# Patient Record
Sex: FEMALE | Race: WHITE | NOT HISPANIC OR LATINO | Employment: OTHER | URBAN - METROPOLITAN AREA
[De-identification: names, ages, dates, MRNs, and addresses within clinical notes are randomized per-mention and may not be internally consistent; named-entity substitution may affect disease eponyms.]

---

## 2023-04-21 DIAGNOSIS — I10 HYPERTENSION, UNSPECIFIED TYPE: Primary | ICD-10-CM

## 2023-04-21 RX ORDER — METOPROLOL SUCCINATE 50 MG/1
TABLET, EXTENDED RELEASE ORAL
COMMUNITY
Start: 2018-10-24 | End: 2023-04-21 | Stop reason: SDUPTHER

## 2023-04-22 RX ORDER — METOPROLOL SUCCINATE 50 MG/1
50 TABLET, EXTENDED RELEASE ORAL DAILY
Qty: 90 TABLET | Refills: 1 | Status: SHIPPED | OUTPATIENT
Start: 2023-04-22 | End: 2023-10-31

## 2023-05-08 LAB — CANCER AG 19-9 (U/ML) IN SER/PLAS: 31.01 U/ML

## 2023-07-14 LAB — CANCER AG 19-9 (U/ML) IN SER/PLAS: 19.36 U/ML

## 2023-07-18 DIAGNOSIS — J45.40 MODERATE PERSISTENT ASTHMA WITHOUT COMPLICATION (HHS-HCC): ICD-10-CM

## 2023-07-18 DIAGNOSIS — I10 HYPERTENSION, UNSPECIFIED TYPE: Primary | ICD-10-CM

## 2023-07-19 RX ORDER — LOSARTAN POTASSIUM 25 MG/1
TABLET ORAL
Qty: 90 TABLET | Refills: 0 | Status: SHIPPED | OUTPATIENT
Start: 2023-07-19 | End: 2023-08-07 | Stop reason: ALTCHOICE

## 2023-07-19 RX ORDER — MONTELUKAST SODIUM 10 MG/1
TABLET ORAL
Qty: 90 TABLET | Refills: 3 | Status: SHIPPED | OUTPATIENT
Start: 2023-07-19

## 2023-08-07 ENCOUNTER — OFFICE VISIT (OUTPATIENT)
Dept: PRIMARY CARE | Facility: CLINIC | Age: 78
End: 2023-08-07
Payer: COMMERCIAL

## 2023-08-07 VITALS
SYSTOLIC BLOOD PRESSURE: 103 MMHG | RESPIRATION RATE: 19 BRPM | OXYGEN SATURATION: 97 % | DIASTOLIC BLOOD PRESSURE: 71 MMHG | TEMPERATURE: 96.6 F | HEART RATE: 81 BPM | WEIGHT: 230 LBS | BODY MASS INDEX: 34.07 KG/M2 | HEIGHT: 69 IN

## 2023-08-07 DIAGNOSIS — J44.9 CHRONIC OBSTRUCTIVE PULMONARY DISEASE, UNSPECIFIED COPD TYPE (MULTI): ICD-10-CM

## 2023-08-07 DIAGNOSIS — I10 HYPERTENSION, UNSPECIFIED TYPE: ICD-10-CM

## 2023-08-07 DIAGNOSIS — E66.09 CLASS 1 OBESITY DUE TO EXCESS CALORIES WITH SERIOUS COMORBIDITY AND BODY MASS INDEX (BMI) OF 33.0 TO 33.9 IN ADULT: ICD-10-CM

## 2023-08-07 DIAGNOSIS — D37.8 INTRADUCTAL PAPILLARY MUCINOUS TUMOR OF UNCERTAIN BEHAVIOR OF PANCREAS: ICD-10-CM

## 2023-08-07 DIAGNOSIS — Z78.0 MENOPAUSE: ICD-10-CM

## 2023-08-07 DIAGNOSIS — Z86.79 HISTORY OF ATRIAL FIBRILLATION: ICD-10-CM

## 2023-08-07 DIAGNOSIS — C34.90 MALIGNANT NEOPLASM OF LUNG, UNSPECIFIED LATERALITY, UNSPECIFIED PART OF LUNG (MULTI): ICD-10-CM

## 2023-08-07 DIAGNOSIS — Z00.00 MEDICARE ANNUAL WELLNESS VISIT, SUBSEQUENT: Primary | ICD-10-CM

## 2023-08-07 DIAGNOSIS — J33.9 NASAL POLYPOSIS: ICD-10-CM

## 2023-08-07 PROBLEM — R73.03 PREDIABETES: Status: ACTIVE | Noted: 2023-08-07

## 2023-08-07 PROBLEM — S13.170A: Status: ACTIVE | Noted: 2023-08-07

## 2023-08-07 PROBLEM — E55.9 VITAMIN D DEFICIENCY: Status: ACTIVE | Noted: 2023-08-07

## 2023-08-07 PROBLEM — J32.2 CHRONIC ETHMOIDAL SINUSITIS: Status: ACTIVE | Noted: 2023-08-07

## 2023-08-07 PROBLEM — R73.03 PREDIABETES: Status: RESOLVED | Noted: 2023-08-07 | Resolved: 2023-08-07

## 2023-08-07 PROBLEM — J45.909 ASTHMA (HHS-HCC): Status: ACTIVE | Noted: 2023-08-07

## 2023-08-07 PROBLEM — R43.8 DECREASED SENSE OF SMELL: Status: ACTIVE | Noted: 2023-08-07

## 2023-08-07 PROCEDURE — 1159F MED LIST DOCD IN RCRD: CPT | Performed by: PEDIATRICS

## 2023-08-07 PROCEDURE — 3074F SYST BP LT 130 MM HG: CPT | Performed by: PEDIATRICS

## 2023-08-07 PROCEDURE — G0439 PPPS, SUBSEQ VISIT: HCPCS | Performed by: PEDIATRICS

## 2023-08-07 PROCEDURE — 3078F DIAST BP <80 MM HG: CPT | Performed by: PEDIATRICS

## 2023-08-07 PROCEDURE — 1170F FXNL STATUS ASSESSED: CPT | Performed by: PEDIATRICS

## 2023-08-07 PROCEDURE — 1160F RVW MEDS BY RX/DR IN RCRD: CPT | Performed by: PEDIATRICS

## 2023-08-07 PROCEDURE — 1036F TOBACCO NON-USER: CPT | Performed by: PEDIATRICS

## 2023-08-07 PROCEDURE — 1126F AMNT PAIN NOTED NONE PRSNT: CPT | Performed by: PEDIATRICS

## 2023-08-07 PROCEDURE — 99213 OFFICE O/P EST LOW 20 MIN: CPT | Performed by: PEDIATRICS

## 2023-08-07 RX ORDER — FUROSEMIDE 20 MG/1
40 TABLET ORAL
COMMUNITY
Start: 2021-11-24 | End: 2023-08-07 | Stop reason: ALTCHOICE

## 2023-08-07 RX ORDER — PREDNISONE 20 MG/1
TABLET ORAL
COMMUNITY
End: 2023-08-07 | Stop reason: ALTCHOICE

## 2023-08-07 RX ORDER — LEVOFLOXACIN 500 MG/1
1 TABLET, FILM COATED ORAL DAILY
COMMUNITY
Start: 2022-10-19 | End: 2023-08-07 | Stop reason: ALTCHOICE

## 2023-08-07 RX ORDER — GUAIFENESIN 600 MG/1
TABLET, EXTENDED RELEASE ORAL EVERY 12 HOURS
COMMUNITY
Start: 2022-11-10 | End: 2023-08-07 | Stop reason: ALTCHOICE

## 2023-08-07 RX ORDER — CHOLECALCIFEROL (VITAMIN D3) 25 MCG
1 TABLET ORAL DAILY
COMMUNITY

## 2023-08-07 RX ORDER — PREDNISONE 10 MG/1
TABLET ORAL
COMMUNITY
End: 2023-08-07 | Stop reason: ALTCHOICE

## 2023-08-07 RX ORDER — TALC
3 POWDER (GRAM) TOPICAL DAILY PRN
COMMUNITY
Start: 2021-11-23 | End: 2023-08-07 | Stop reason: ALTCHOICE

## 2023-08-07 RX ORDER — METOPROLOL TARTRATE 25 MG/1
25 TABLET, FILM COATED ORAL
COMMUNITY
Start: 2021-11-02 | End: 2023-08-07 | Stop reason: ALTCHOICE

## 2023-08-07 RX ORDER — CALCIUM CARBONATE 200(500)MG
500 TABLET,CHEWABLE ORAL 3 TIMES DAILY PRN
COMMUNITY
Start: 2021-11-23 | End: 2023-08-07 | Stop reason: ALTCHOICE

## 2023-08-07 RX ORDER — FLUTICASONE PROPIONATE 93 UG/1
SPRAY, METERED NASAL
COMMUNITY
Start: 2020-01-22

## 2023-08-07 RX ORDER — IPRATROPIUM BROMIDE AND ALBUTEROL SULFATE 2.5; .5 MG/3ML; MG/3ML
SOLUTION RESPIRATORY (INHALATION) 4 TIMES DAILY
COMMUNITY
Start: 2022-11-05 | End: 2023-08-07 | Stop reason: ALTCHOICE

## 2023-08-07 RX ORDER — FLUTICASONE FUROATE, UMECLIDINIUM BROMIDE AND VILANTEROL TRIFENATATE 200; 62.5; 25 UG/1; UG/1; UG/1
POWDER RESPIRATORY (INHALATION)
COMMUNITY
Start: 2022-12-02 | End: 2024-05-02

## 2023-08-07 RX ORDER — MV/FA/DHA/EPA/FISH OIL/SAW/GNK 400MCG-200
1 COMBINATION PACKAGE (EA) ORAL DAILY
COMMUNITY
Start: 2022-02-24

## 2023-08-07 RX ORDER — LORATADINE 10 MG/1
10 TABLET ORAL
COMMUNITY
End: 2023-08-07 | Stop reason: ALTCHOICE

## 2023-08-07 RX ORDER — BENZONATATE 100 MG/1
CAPSULE ORAL 4 TIMES DAILY PRN
COMMUNITY
Start: 2022-11-10 | End: 2023-08-07 | Stop reason: ALTCHOICE

## 2023-08-07 RX ORDER — DOXYCYCLINE 100 MG/1
100 CAPSULE ORAL 2 TIMES DAILY
COMMUNITY
End: 2023-08-07 | Stop reason: ALTCHOICE

## 2023-08-07 RX ORDER — ALBUTEROL SULFATE 90 UG/1
AEROSOL, METERED RESPIRATORY (INHALATION)
COMMUNITY
Start: 2022-12-14

## 2023-08-07 RX ORDER — OSIMERTINIB 80 1/1
TABLET, FILM COATED ORAL
COMMUNITY
Start: 2023-01-09 | End: 2023-12-29 | Stop reason: WASHOUT

## 2023-08-07 RX ORDER — SENNOSIDES 8.6 MG/1
1 TABLET ORAL 2 TIMES DAILY
COMMUNITY
End: 2023-08-07 | Stop reason: ALTCHOICE

## 2023-08-07 RX ORDER — TRIAMCINOLONE ACETONIDE 1 MG/G
1 CREAM TOPICAL 2 TIMES DAILY
COMMUNITY
Start: 2021-11-23 | End: 2023-08-07 | Stop reason: ALTCHOICE

## 2023-08-07 RX ORDER — ACETAMINOPHEN 325 MG/1
TABLET ORAL EVERY 6 HOURS PRN
COMMUNITY
Start: 2022-03-05

## 2023-08-07 RX ORDER — POLYETHYLENE GLYCOL 3350 17 G/17G
17 POWDER, FOR SOLUTION ORAL DAILY PRN
COMMUNITY
Start: 2021-11-23 | End: 2023-08-07 | Stop reason: ALTCHOICE

## 2023-08-07 ASSESSMENT — ACTIVITIES OF DAILY LIVING (ADL)
DRESSING: INDEPENDENT
BATHING: INDEPENDENT
MANAGING_FINANCES: INDEPENDENT
DOING_HOUSEWORK: INDEPENDENT
TAKING_MEDICATION: INDEPENDENT
GROCERY_SHOPPING: INDEPENDENT

## 2023-08-07 ASSESSMENT — PAIN SCALES - GENERAL: PAINLEVEL: 0-NO PAIN

## 2023-08-07 ASSESSMENT — PATIENT HEALTH QUESTIONNAIRE - PHQ9
SUM OF ALL RESPONSES TO PHQ9 QUESTIONS 1 AND 2: 0
1. LITTLE INTEREST OR PLEASURE IN DOING THINGS: NOT AT ALL
2. FEELING DOWN, DEPRESSED OR HOPELESS: NOT AT ALL

## 2023-08-07 NOTE — PROGRESS NOTES
"Subjective   Patient ID: Tracey Chiu is a 78 y.o. female who presents for Medicare Annual Wellness Visit Subsequent.    HPI   Patient is doing well in terms of her lung cancer; Follows with ENT next week; Does not want pancreatic surgery; Does not want mammogram till next year.    Review of Systems    Objective   /71 (BP Location: Left arm, Patient Position: Sitting, BP Cuff Size: Large adult)   Pulse 81   Temp 35.9 °C (96.6 °F) (Temporal)   Resp 19   Ht 1.753 m (5' 9\")   Wt 104 kg (230 lb)   LMP  (LMP Unknown)   SpO2 97%   BMI 33.97 kg/m²     Physical Exam  Vitals reviewed.   Constitutional:       General: She is not in acute distress.  HENT:      Head: Normocephalic.      Right Ear: Tympanic membrane normal.      Left Ear: Tympanic membrane normal.      Nose:      Comments: Nasal polyp on right     Mouth/Throat:      Pharynx: Oropharynx is clear.   Cardiovascular:      Rate and Rhythm: Normal rate and regular rhythm.      Heart sounds: Normal heart sounds.   Pulmonary:      Breath sounds: Normal breath sounds.   Abdominal:      Palpations: Abdomen is soft.      Tenderness: There is no abdominal tenderness.   Musculoskeletal:         General: No tenderness. Normal range of motion.   Skin:     General: Skin is warm.      Findings: No rash.   Neurological:      General: No focal deficit present.      Mental Status: She is alert.   Psychiatric:         Mood and Affect: Mood normal.       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

## 2023-10-02 ENCOUNTER — SPECIALTY PHARMACY (OUTPATIENT)
Dept: PHARMACY | Facility: CLINIC | Age: 78
End: 2023-10-02

## 2023-10-06 ENCOUNTER — PHARMACY VISIT (OUTPATIENT)
Dept: PHARMACY | Facility: CLINIC | Age: 78
End: 2023-10-06
Payer: COMMERCIAL

## 2023-10-06 PROBLEM — A41.9 SEPSIS (MULTI): Status: ACTIVE | Noted: 2023-10-06

## 2023-10-06 PROBLEM — E66.9 OBESITY, CLASS I, BMI 30-34.9: Status: ACTIVE | Noted: 2021-10-29

## 2023-10-06 PROBLEM — I48.0 PAROXYSMAL ATRIAL FIBRILLATION WITH RVR (MULTI): Status: ACTIVE | Noted: 2023-10-06

## 2023-10-06 PROBLEM — J44.1 COPD EXACERBATION (MULTI): Status: ACTIVE | Noted: 2023-08-07

## 2023-10-06 PROBLEM — L82.1 SEBORRHEIC KERATOSIS: Status: ACTIVE | Noted: 2023-10-06

## 2023-10-06 PROBLEM — Q45.3 PANCREATIC ABNORMALITY: Status: ACTIVE | Noted: 2023-10-06

## 2023-10-06 PROBLEM — R93.89 ABNORMAL CT SCAN: Status: ACTIVE | Noted: 2023-10-06

## 2023-10-06 PROBLEM — I48.91 ATRIAL FIBRILLATION (MULTI): Status: ACTIVE | Noted: 2023-10-06

## 2023-10-06 PROBLEM — S12.9XXA CERVICAL SPINE FRACTURE (MULTI): Status: ACTIVE | Noted: 2021-10-27

## 2023-10-06 PROBLEM — K86.2 PANCREAS CYST (HHS-HCC): Status: ACTIVE | Noted: 2023-10-06

## 2023-10-06 PROBLEM — R91.8 LUNG MASS: Status: ACTIVE | Noted: 2023-10-06

## 2023-10-06 PROBLEM — E66.811 OBESITY, CLASS I, BMI 30-34.9: Status: ACTIVE | Noted: 2021-10-29

## 2023-10-06 PROCEDURE — RXMED WILLOW AMBULATORY MEDICATION CHARGE

## 2023-10-06 RX ORDER — TAMSULOSIN HYDROCHLORIDE 0.4 MG/1
1 CAPSULE ORAL
COMMUNITY
Start: 2021-11-03 | End: 2023-12-12 | Stop reason: ALTCHOICE

## 2023-10-06 RX ORDER — MOMETASONE FUROATE 50 UG/1
2 SPRAY, METERED NASAL DAILY
COMMUNITY
Start: 2007-07-12 | End: 2023-12-12 | Stop reason: ALTCHOICE

## 2023-10-06 RX ORDER — METOPROLOL TARTRATE 75 MG/1
1 TABLET, FILM COATED ORAL DAILY
COMMUNITY
Start: 2021-11-03 | End: 2023-12-12 | Stop reason: ALTCHOICE

## 2023-10-06 RX ORDER — LOSARTAN POTASSIUM 50 MG/1
1 TABLET ORAL DAILY
COMMUNITY
Start: 2021-11-02 | End: 2023-12-12 | Stop reason: ALTCHOICE

## 2023-10-06 RX ORDER — SENNOSIDES 8.6 MG/1
1 TABLET ORAL 2 TIMES DAILY PRN
COMMUNITY
Start: 2021-11-02 | End: 2023-12-12 | Stop reason: ALTCHOICE

## 2023-10-06 NOTE — PROGRESS NOTES
"Patient address: [address] [place] [address] [place] [address]  Patient Medications: (For current medications to populate, please use .Local [employer] smart link)    Patient called and informed me she never received her delivery that was scheduled to be shipped to her on 10/5/2023. Sent an email to [facility] requesting more information in regard this. Received update stating \"We don't have this in stock. We can send same day on Monday\" Informed pt of this and she requested to  medication on Monday from 1-5pm.     "

## 2023-10-09 ENCOUNTER — OFFICE VISIT (OUTPATIENT)
Dept: OTOLARYNGOLOGY | Facility: CLINIC | Age: 78
End: 2023-10-09
Payer: MEDICARE

## 2023-10-09 VITALS — HEIGHT: 70 IN | TEMPERATURE: 97.2 F | BODY MASS INDEX: 32.43 KG/M2 | WEIGHT: 226.5 LBS

## 2023-10-09 DIAGNOSIS — J32.0 CHRONIC MAXILLARY SINUSITIS: ICD-10-CM

## 2023-10-09 DIAGNOSIS — J33.9 NASAL POLYP: Primary | ICD-10-CM

## 2023-10-09 DIAGNOSIS — R43.8 DECREASED SENSE OF SMELL: ICD-10-CM

## 2023-10-09 DIAGNOSIS — J32.2 CHRONIC ETHMOIDAL SINUSITIS: ICD-10-CM

## 2023-10-09 PROCEDURE — 1159F MED LIST DOCD IN RCRD: CPT | Performed by: OTOLARYNGOLOGY

## 2023-10-09 PROCEDURE — 1126F AMNT PAIN NOTED NONE PRSNT: CPT | Performed by: OTOLARYNGOLOGY

## 2023-10-09 PROCEDURE — 99213 OFFICE O/P EST LOW 20 MIN: CPT | Performed by: OTOLARYNGOLOGY

## 2023-10-09 PROCEDURE — 1160F RVW MEDS BY RX/DR IN RCRD: CPT | Performed by: OTOLARYNGOLOGY

## 2023-10-09 PROCEDURE — 31231 NASAL ENDOSCOPY DX: CPT | Performed by: OTOLARYNGOLOGY

## 2023-10-09 PROCEDURE — 1036F TOBACCO NON-USER: CPT | Performed by: OTOLARYNGOLOGY

## 2023-10-09 ASSESSMENT — PATIENT HEALTH QUESTIONNAIRE - PHQ9
1. LITTLE INTEREST OR PLEASURE IN DOING THINGS: NOT AT ALL
2. FEELING DOWN, DEPRESSED OR HOPELESS: NOT AT ALL
SUM OF ALL RESPONSES TO PHQ9 QUESTIONS 1 AND 2: 0

## 2023-10-09 NOTE — PROGRESS NOTES
Chief Complaint  1. Chronic sinusitis with nasal polyposis; aspirin sensitivity  2. Rhinorrhea  3. Nasal airway obstruction  4. Decreased sense of smell  5. Allergic rhinitis  6. Lung adenocarcinoma    History Of Present Illness  Patient does not have anterior nasal drainage.     Patient does not have  posterior nasal drainage.    Patient does not have nasal airway obstruction.   Patient does not have  facial pain.    Patient does not have  facial pressure.    Patient has decreased sense of smell. Decreased <5 % of normal.   Associated Symptoms:   Patient does not have  headaches.    Patient has throat clearing, but not significant issue  Patient has coughing, but not significant issue  Patient does not have dysphonia.   Patient has nasal bleeding, but not significant issue    Medications currently on for sinonasal symptoms:  Xhance 1 spray each nostril BID, Singular    Reason for this visit:    Tracey Chiu presents being last seen 2/13/23 for routine follow-up.     She had an exacerbation of symptoms and was prescribed doxycycline and prednisone in September 2023 that significantly improved her symptoms.  She is considering Nucala with Dr. Andrews and we discussed considerations with biologic therapy today.     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    Lung cancer (CMS/HCC)    Nasal polyposis    Subluxation of C6-C7 cervical vertebrae    Vitamin D deficiency    Abnormal CT scan    Atrial fibrillation (CMS/HCC)    Obesity, Class I, BMI 30-34.9    Sepsis (CMS/HCC)    Cervical spine fracture (CMS/HCC)    Paroxysmal atrial fibrillation with RVR (CMS/HCC)    Lung mass    Pancreas cyst    Pancreatic ductal abnormality    Seborrheic keratosis        Past Medical History  She  has a past medical history of Asymptomatic menopausal state (04/25/2018), Personal history of other diseases of the respiratory system, and Personal history of other diseases of the respiratory system (01/22/2016).    Surgical History  She has a past surgical history that includes Nose surgery (07/20/2013); Tonsillectomy (07/20/2013); Other surgical history (11/28/2021); Other surgical history (03/10/2022); and Mouth surgery (01/26/2015).     Family History  Family History   Problem Relation Name Age of Onset    Ovarian cancer Mother      Prostate cancer Father      Prostate cancer Brother         Social History  She reports that she has never smoked. She has never used smokeless tobacco. She reports current alcohol use of about 14.0 standard drinks of alcohol per week. She reports that she does not use drugs.     Allergies  Aspirin    Current Meds  Current Outpatient Medications:     acetaminophen (Tylenol) 325 mg tablet, Take by mouth every 6 hours if needed., Disp: , Rfl:     albuterol (Ventolin HFA) 90 mcg/actuation inhaler, Inhale., Disp: , Rfl:     cholecalciferol (Vitamin D-3) 25 MCG (1000 UT) tablet, Take 1 tablet (25 mcg) by mouth once daily., Disp: , Rfl:     krill oil 500 mg capsule, Take 1 capsule by mouth once daily., Disp: , Rfl:     losartan (Cozaar) 50 mg tablet, Take 1 tablet (50 mg) by mouth once daily., Disp: , Rfl:     metoprolol succinate XL (Toprol-XL) 50 mg 24 hr tablet, Take 1 tablet (50 mg) by mouth once daily., Disp: 90 tablet, Rfl: 1    metoprolol tartrate (Lopressor) 75 mg tablet, Take 1 tablet (75 mg) by mouth once daily., Disp: , Rfl:     mometasone (Nasonex) 50 mcg/actuation nasal spray, Administer 2 sprays into each nostril once daily., Disp: , Rfl:     montelukast (Singulair) 10 mg tablet, TAKE 1 TABLET DAILY AS DIRECTED, Disp: 90 tablet, Rfl: 3    multivit/folic acid/vit K1 (ONE-A-DAY WOMEN'S 50 PLUS ORAL), Take 1 tablet by mouth once daily., Disp: , Rfl:     osimertinib (Tagrisso)  80 mg tablet, TAKE 80 MG (1 TABLET) BY MOUTH ONCE DAILY., Disp: 30 tablet, Rfl: 3    sennosides (Senokot) 8.6 mg tablet, Take 1 tablet (8.6 mg) by mouth 2 times a day as needed., Disp: , Rfl:     Tagrisso 80 mg tablet, Take by mouth., Disp: , Rfl:     tamsulosin (Flomax) 0.4 mg 24 hr capsule, Take 1 capsule (0.4 mg) by mouth once daily. For 3 doses, Disp: , Rfl:     Trelegy Ellipta 200-62.5-25 mcg blister with device, Inhale., Disp: , Rfl:     Xhance 93 mcg/actuation aerosol breath activated, Administer into affected nostril(s)., Disp: , Rfl:     Vitals  Visit Vitals  LMP  (LMP Unknown)   OB Status Postmenopausal   Smoking Status Never        Physical Exam  Nose: On external exam there are neither lesions nor asymmetry of the nasal tip/dorsum. On anterior rhinoscopy, visualization posteriorly is limited on anterior examination. For this reason, to adequately evaluate posteriorly for masses, source of epistaxis, polypoid disease, debridement, and/or signs of infections, nasal endoscopy is indicated.  (Please see procedure below.)    SINONASAL ENDOSCOPY (CPT 59303): To better evaluate the patient's symptoms, sinonasal endoscopy is indicated.  After discussion of risks and benefits, and topical decongestion and anesthesia, an endoscope was used to perform nasal endoscopy on each side.  A time out identifying the patient, the procedure, the location of the procedure and any concerns was performed prior to beginning the procedure.    Findings:  Examination of the right nasal cavity revealed polypoid tissue within the middle meatus and sphenoethmoid recess extending to the superior aspect of the inferior turbinate.  There was no pus seen today.  Examination of the left nasal cavity demonstrated a significantly improved exam.  There is polypoid tissue extending to the superior aspect of the maxillary sinus but I was able to see into the sinus today as well as her ethmoid cavity.  No pus on the left.    Provider Impressions    1. Chronic sinusitis with nasal polyposis; aspirin sensitivity  2. Decreased sense of smell  3. Allergic rhinitis  4. Lung adenocarcinoma    Discussion:  Tracey Chiu and I discussed her current exam.  Particularly on the left, she is significantly improved.  We again reviewed long-term risks with oral steroid therapy and I think that she would do well considering a biologic if she does not wish to consider surgical intervention moving forward.  I would like her to continue Xhance and I recommended follow-up in 6 months.  If her exam significantly improves once she initiates the biologic we could consider discontinuation of the topical steroid but I would like her to continue this in the short-term.  All questions were answered.    Patient Discussion/Summary  Please followup with me in 6 months for reevaluation or sooner with any questions or concerns. Please feel free to contact my office by calling [phone] with any questions.    Signature  Scribe Attestation  By signing my name below, IMariaelena Scribe   attest that this documentation has been prepared under the direction and in the presence of Andrew Thorne MD.

## 2023-10-31 ENCOUNTER — SPECIALTY PHARMACY (OUTPATIENT)
Dept: PHARMACY | Facility: CLINIC | Age: 78
End: 2023-10-31

## 2023-10-31 DIAGNOSIS — I10 HYPERTENSION, UNSPECIFIED TYPE: ICD-10-CM

## 2023-10-31 RX ORDER — METOPROLOL SUCCINATE 50 MG/1
50 TABLET, EXTENDED RELEASE ORAL DAILY
Qty: 90 TABLET | Refills: 0 | Status: SHIPPED | OUTPATIENT
Start: 2023-10-31 | End: 2024-01-18

## 2023-11-01 ENCOUNTER — OFFICE VISIT (OUTPATIENT)
Dept: HEMATOLOGY/ONCOLOGY | Facility: HOSPITAL | Age: 78
End: 2023-11-01
Payer: MEDICARE

## 2023-11-01 ENCOUNTER — PHARMACY VISIT (OUTPATIENT)
Dept: PHARMACY | Facility: CLINIC | Age: 78
End: 2023-11-01
Payer: COMMERCIAL

## 2023-11-01 ENCOUNTER — LAB (OUTPATIENT)
Dept: LAB | Facility: HOSPITAL | Age: 78
End: 2023-11-01
Payer: MEDICARE

## 2023-11-01 VITALS
HEIGHT: 68 IN | BODY MASS INDEX: 34.3 KG/M2 | TEMPERATURE: 97 F | DIASTOLIC BLOOD PRESSURE: 72 MMHG | OXYGEN SATURATION: 98 % | HEART RATE: 71 BPM | WEIGHT: 226.3 LBS | RESPIRATION RATE: 17 BRPM | SYSTOLIC BLOOD PRESSURE: 138 MMHG

## 2023-11-01 DIAGNOSIS — C34.90 LUNG CANCER (MULTI): ICD-10-CM

## 2023-11-01 LAB
ALBUMIN SERPL BCP-MCNC: 3.9 G/DL (ref 3.4–5)
ALP SERPL-CCNC: 91 U/L (ref 33–136)
ALT SERPL W P-5'-P-CCNC: 14 U/L (ref 7–45)
ANION GAP SERPL CALC-SCNC: 12 MMOL/L (ref 10–20)
AST SERPL W P-5'-P-CCNC: 17 U/L (ref 9–39)
BASOPHILS # BLD AUTO: 0.04 X10*3/UL (ref 0–0.1)
BASOPHILS NFR BLD AUTO: 0.6 %
BILIRUB SERPL-MCNC: 0.6 MG/DL (ref 0–1.2)
BUN SERPL-MCNC: 13 MG/DL (ref 6–23)
CALCIUM SERPL-MCNC: 9.2 MG/DL (ref 8.6–10.3)
CHLORIDE SERPL-SCNC: 107 MMOL/L (ref 98–107)
CO2 SERPL-SCNC: 29 MMOL/L (ref 21–32)
CREAT SERPL-MCNC: 0.94 MG/DL (ref 0.5–1.05)
EOSINOPHIL # BLD AUTO: 0.32 X10*3/UL (ref 0–0.4)
EOSINOPHIL NFR BLD AUTO: 4.5 %
ERYTHROCYTE [DISTWIDTH] IN BLOOD BY AUTOMATED COUNT: 14.4 % (ref 11.5–14.5)
GFR SERPL CREATININE-BSD FRML MDRD: 63 ML/MIN/1.73M*2
GLUCOSE SERPL-MCNC: 95 MG/DL (ref 74–99)
HCT VFR BLD AUTO: 43.4 % (ref 36–46)
HGB BLD-MCNC: 14.1 G/DL (ref 12–16)
IMM GRANULOCYTES # BLD AUTO: 0.05 X10*3/UL (ref 0–0.5)
IMM GRANULOCYTES NFR BLD AUTO: 0.7 % (ref 0–0.9)
LYMPHOCYTES # BLD AUTO: 1.54 X10*3/UL (ref 0.8–3)
LYMPHOCYTES NFR BLD AUTO: 21.8 %
MCH RBC QN AUTO: 30.9 PG (ref 26–34)
MCHC RBC AUTO-ENTMCNC: 32.5 G/DL (ref 32–36)
MCV RBC AUTO: 95 FL (ref 80–100)
MONOCYTES # BLD AUTO: 0.79 X10*3/UL (ref 0.05–0.8)
MONOCYTES NFR BLD AUTO: 11.2 %
NEUTROPHILS # BLD AUTO: 4.32 X10*3/UL (ref 1.6–5.5)
NEUTROPHILS NFR BLD AUTO: 61.2 %
NRBC BLD-RTO: 0 /100 WBCS (ref 0–0)
PLATELET # BLD AUTO: 195 X10*3/UL (ref 150–450)
POTASSIUM SERPL-SCNC: 4.2 MMOL/L (ref 3.5–5.3)
PROT SERPL-MCNC: 6.6 G/DL (ref 6.4–8.2)
RBC # BLD AUTO: 4.57 X10*6/UL (ref 4–5.2)
SODIUM SERPL-SCNC: 144 MMOL/L (ref 136–145)
WBC # BLD AUTO: 7.1 X10*3/UL (ref 4.4–11.3)

## 2023-11-01 PROCEDURE — 1126F AMNT PAIN NOTED NONE PRSNT: CPT | Performed by: CLINICAL NURSE SPECIALIST

## 2023-11-01 PROCEDURE — 1160F RVW MEDS BY RX/DR IN RCRD: CPT | Performed by: CLINICAL NURSE SPECIALIST

## 2023-11-01 PROCEDURE — 36415 COLL VENOUS BLD VENIPUNCTURE: CPT

## 2023-11-01 PROCEDURE — 85025 COMPLETE CBC W/AUTO DIFF WBC: CPT

## 2023-11-01 PROCEDURE — 3078F DIAST BP <80 MM HG: CPT | Performed by: CLINICAL NURSE SPECIALIST

## 2023-11-01 PROCEDURE — 1036F TOBACCO NON-USER: CPT | Performed by: CLINICAL NURSE SPECIALIST

## 2023-11-01 PROCEDURE — 99214 OFFICE O/P EST MOD 30 MIN: CPT | Performed by: CLINICAL NURSE SPECIALIST

## 2023-11-01 PROCEDURE — RXMED WILLOW AMBULATORY MEDICATION CHARGE

## 2023-11-01 PROCEDURE — 80053 COMPREHEN METABOLIC PANEL: CPT

## 2023-11-01 PROCEDURE — 1159F MED LIST DOCD IN RCRD: CPT | Performed by: CLINICAL NURSE SPECIALIST

## 2023-11-01 PROCEDURE — 3075F SYST BP GE 130 - 139MM HG: CPT | Performed by: CLINICAL NURSE SPECIALIST

## 2023-11-01 ASSESSMENT — ENCOUNTER SYMPTOMS
DEPRESSION: 0
LOSS OF SENSATION IN FEET: 0
OCCASIONAL FEELINGS OF UNSTEADINESS: 0

## 2023-11-01 ASSESSMENT — PAIN SCALES - GENERAL: PAINLEVEL: 0-NO PAIN

## 2023-11-13 ENCOUNTER — OFFICE VISIT (OUTPATIENT)
Dept: PULMONOLOGY | Facility: CLINIC | Age: 78
End: 2023-11-13
Payer: MEDICARE

## 2023-11-13 VITALS
SYSTOLIC BLOOD PRESSURE: 153 MMHG | RESPIRATION RATE: 16 BRPM | HEART RATE: 94 BPM | DIASTOLIC BLOOD PRESSURE: 83 MMHG | OXYGEN SATURATION: 94 % | WEIGHT: 222 LBS | BODY MASS INDEX: 33.34 KG/M2

## 2023-11-13 DIAGNOSIS — C34.90 PRIMARY ADENOCARCINOMA OF LUNG, UNSPECIFIED LATERALITY (MULTI): ICD-10-CM

## 2023-11-13 DIAGNOSIS — J44.9 CHRONIC OBSTRUCTIVE PULMONARY DISEASE, UNSPECIFIED COPD TYPE (MULTI): ICD-10-CM

## 2023-11-13 DIAGNOSIS — J45.909 ASTHMA, UNSPECIFIED ASTHMA SEVERITY, UNSPECIFIED WHETHER COMPLICATED, UNSPECIFIED WHETHER PERSISTENT (HHS-HCC): Primary | ICD-10-CM

## 2023-11-13 PROCEDURE — 3077F SYST BP >= 140 MM HG: CPT | Performed by: INTERNAL MEDICINE

## 2023-11-13 PROCEDURE — 3079F DIAST BP 80-89 MM HG: CPT | Performed by: INTERNAL MEDICINE

## 2023-11-13 PROCEDURE — 1126F AMNT PAIN NOTED NONE PRSNT: CPT | Performed by: INTERNAL MEDICINE

## 2023-11-13 PROCEDURE — 1160F RVW MEDS BY RX/DR IN RCRD: CPT | Performed by: INTERNAL MEDICINE

## 2023-11-13 PROCEDURE — 99214 OFFICE O/P EST MOD 30 MIN: CPT | Performed by: INTERNAL MEDICINE

## 2023-11-13 PROCEDURE — 1036F TOBACCO NON-USER: CPT | Performed by: INTERNAL MEDICINE

## 2023-11-13 PROCEDURE — 1159F MED LIST DOCD IN RCRD: CPT | Performed by: INTERNAL MEDICINE

## 2023-11-13 ASSESSMENT — PAIN SCALES - GENERAL: PAINLEVEL: 0-NO PAIN

## 2023-11-13 ASSESSMENT — COLUMBIA-SUICIDE SEVERITY RATING SCALE - C-SSRS
6. HAVE YOU EVER DONE ANYTHING, STARTED TO DO ANYTHING, OR PREPARED TO DO ANYTHING TO END YOUR LIFE?: NO
2. HAVE YOU ACTUALLY HAD ANY THOUGHTS OF KILLING YOURSELF?: NO

## 2023-11-13 ASSESSMENT — ENCOUNTER SYMPTOMS: DEPRESSION: 0

## 2023-11-13 NOTE — PROGRESS NOTES
Department of Medicine  Division of Pulmonary, Critical Care, and Sleep Medicine  Follow-Up Visit  [facility] - [address], [address]    Physician HPI (11/13/2023):  Pleasant 78-year-old female with history of asthma/COPD, nasal polyps, stage IV lung adenocarcinoma presenting for follow-up.  Has been doing well since last visit, started on Nucala by allergy.  Compliant with inhalers, rarely needing albuterol.  No exacerbations since last visit.      Immunization History   Administered Date(s) Administered    Flu vaccine, quadrivalent, high-dose, preservative free, age 65y+ (FLUZONE) 11/09/2020, 10/03/2022    Influenza, High Dose Seasonal, Preservative Free 10/27/2017, 11/08/2018    Influenza, Seasonal, Quadrivalent, Adjuvanted 10/19/2021    Influenza, trivalent, adjuvanted 11/01/2019    Pfizer COVID-19 vaccine, bivalent, age 12 years and older (30 mcg/0.3 mL) 10/03/2022    Pfizer Gray Cap SARS-CoV-2 04/12/2022    Pfizer Purple Cap SARS-CoV-2 02/09/2021, 03/02/2021, 09/28/2021    Pneumococcal conjugate vaccine, 13-valent (PREVNAR 13) 01/16/2015    Pneumococcal polysaccharide vaccine, 23-valent, age 2 years and older (PNEUMOVAX 23) 09/08/2012, 04/26/2022    Tdap vaccine, age 7 year and older (BOOSTRIX) 09/08/2012, 01/01/2018    Zoster vaccine, recombinant, adult (SHINGRIX) 06/05/2023, 09/01/2023       Current Medications:  Current Outpatient Medications   Medication Instructions    acetaminophen (Tylenol) 325 mg tablet oral, Every 6 hours PRN    albuterol (Ventolin HFA) 90 mcg/actuation inhaler inhalation    cholecalciferol (Vitamin D-3) 25 MCG (1000 UT) tablet 1 tablet, oral, Daily    krill oil 500 mg capsule 1 capsule, oral, Daily    losartan (Cozaar) 50 mg tablet 1 tablet, oral, Daily    metoprolol succinate XL (TOPROL-XL) 50 mg, oral, Daily    metoprolol tartrate (Lopressor) 75 mg tablet 1 tablet, oral, Daily    mometasone (Nasonex) 50 mcg/actuation nasal spray 2 sprays, Each Nostril, Daily     "montelukast (Singulair) 10 mg tablet TAKE 1 TABLET DAILY AS DIRECTED    multivit/folic acid/vit K1 (ONE-A-DAY WOMEN'S 50 PLUS ORAL) 1 tablet, oral, Daily    osimertinib (Tagrisso) 80 mg tablet TAKE 80 MG (1 TABLET) BY MOUTH ONCE DAILY.    sennosides (Senokot) 8.6 mg tablet 1 tablet, oral, 2 times daily PRN    Tagrisso 80 mg tablet oral    tamsulosin (Flomax) 0.4 mg 24 hr capsule 1 capsule, oral, Daily RT, For 3 doses    Trelegy Ellipta 200-62.5-25 mcg blister with device inhalation    Xhance 93 mcg/actuation aerosol breath activated nasal        Drug Allergies/Intolerances:  Allergies   Allergen Reactions    Aspirin Other     anxiety    maybe allergic, had panic and allergy is questionable??          Visit Vitals  /83   Pulse 94   Resp 16   Wt 101 kg (222 lb)   LMP  (LMP Unknown)   SpO2 94%   BMI 33.34 kg/m²   OB Status Postmenopausal   Smoking Status Former   BSA 2.21 m²        Physical exam  Constitutional: Normal appearance.  HEENT: Normocephalic and atraumatic.  Cardiovascular: Normal rate and regular rhythm.  Pulmonary: Normal respiratory effort, bilateral clear breath sounds, no wheezing or rhonchi.  Musculoskeletal: No edema, no cyanosis.  Neurological: Awake, alert and oriented x3.  Psychiatric: Normal behavior, mood and affect.      Pulmonary Function Test Results     Pulmonary Functions Testing Results:    No results found for: \"FEV1\", \"FVC\", \"RCX5NUR\", \"TLC\", \"DLCO\"      Chest Radiograph     XR chest 1 view 11/08/2022    Narrative  MRN: [id number]  Patient Name: ERIKA GOMEZ    STUDY:  CHEST 1 VIEW;  11/8/2022 7:46 am    INDICATION:  pneumonia .    COMPARISON:  11/05/2022; CT chest dated 11/05/2022    ACCESSION NUMBER(S):  [id number]    ORDERING CLINICIAN:  YUKI PRATT    TECHNIQUE:    FINDINGS:  Heart is normal in size.    There is poor definition of the left hemidiaphragm. There is no  discrete consolidation.    There appears to be tortuosity of the aorta.    There are degenerative changes " spine.    COMPARISON OF FINDING:  The chest is similar.    Impression  The spiculated area in the left upper lobe visible on CT is not well  seen. This may be due to a combination of rotation and overlying  wires.    Question of left basilar atelectasis or scarring.      Echocardiogram     Echocardiogram     [facility] at [facility], [address] [address]  Tel [phone] and Fax [phone]    TRANSTHORACIC ECHOCARDIOGRAM REPORT      Patient Name:     ERIKA GOMEZ Reading Physician:   [id number] Margo Long MD  Study Date:       12/12/2022     Referring Physician: MALGORZATA CHAND  MRN/PID:          [id number]       PCP:  Accession/Order#: [id number]      Department Location: [facility] Echo Lab  YOB: 1945      Fellow:  Gender:           F              Nurse:  Admit Date:                      Sonographer:         Mishel Boudreaux [facility]  Admission Status: Outpatient     Additional Staff:  Height:           175.26 cm      CC Report to:  Weight:           99.79 kg       Study Type:          Echocardiogram  BSA:              2.15 m2  Blood Pressure: 128 /80 mmHg    Diagnosis/ICD: R06.02-Shortness of breath  Indication:    SOB  Procedure/CPT: Echo Complete w Full [id number]    Patient History:  Pertinent History: A-Fib and HTN. Adenocarcinoma.    Study Detail: The following Echo studies were performed: 2D, M-Mode, Doppler and  color flow. Technically challenging study due to body habitus.      PHYSICIAN INTERPRETATION:  Left Ventricle: The left ventricular systolic function is normal, with an estimated ejection fraction of 60-65%. There are no regional wall motion abnormalities. The left ventricular cavity size is normal. Left ventricular diastolic filling was indeterminate.  Left Atrium: The left atrium is mildly dilated.  Right Ventricle: The right ventricle is normal in size. There is normal right ventricular global systolic function.  Right Atrium: The  right atrium is normal in size.  Aortic Valve: The aortic valve is trileaflet. There is no evidence of aortic valve regurgitation. The peak instantaneous gradient of the aortic valve is 7.4 mmHg.  Mitral Valve: The mitral valve is normal in structure. There is trace to mild mitral valve regurgitation.  Tricuspid Valve: The tricuspid valve is structurally normal. There is trace tricuspid regurgitation. The Doppler estimated RVSP is within normal limits at 28.1 mmHg.  Pulmonic Valve: The pulmonic valve is not well visualized. There is physiologic pulmonic valve regurgitation.  Pericardium: There is a trivial pericardial effusion. There is an anterior clear space.  Aorta: The aortic root is normal.  Systemic Veins: The inferior vena cava appears to be of normal size. There is IVC inspiratory collapse greater than 50%.  In comparison to the previous echocardiogram(s): Compared with the prior exam from 9/24/2018 ( [facility]) there are no signficant changes.      CONCLUSIONS:  1. Left ventricular systolic function is normal with a 60-65% estimated ejection fraction.  2. RVSP within normal limits.  3. Compared with the prior exam from 9/24/2018 ( [facility]) there are no signficant changes.    QUANTITATIVE DATA SUMMARY:  2D MEASUREMENTS:  Normal Ranges:  Ao Root d:     3.20 cm   (2.0-3.7cm)  LAs:           3.15 cm   (2.7-4.0cm)  RVIDd:         2.04 cm   (0.9-3.6cm)  IVSd:          0.86 cm   (0.6-1.1cm)  LVPWd:         0.89 cm   (0.6-1.1cm)  LVIDd:         4.74 cm   (3.9-5.9cm)  LVIDs:         3.34 cm  LV Mass Index: 64.4 g/m2  LV % FS        29.5 %    LA VOLUME:  Normal Ranges:  LA Vol A4C:       77.2 ml    (22+/-6mL/m2)  LA Vol A2C:       85.0 ml  LA Vol BP:        81.7 ml  LA Vol Index A4C: 35.9 ml/m2  LA Vol Index A2C: 39.5 ml/m2  LA Vol Index BP:  38.0 ml/m2  LA Volume Index:  38.0 ml/m2  LA Vol A4C:       71.4 ml  LA Vol A2C:       82.3 ml    RA VOLUME BY A/L METHOD:  Normal Ranges:  RA Area A4C: 14.3  cm2    AORTA MEASUREMENTS:  Normal Ranges:  Ao Sinus, d: 3.30 cm (2.1-3.5cm)  Asc Ao, d:   3.30 cm (2.1-3.4cm)    LV SYSTOLIC FUNCTION BY 2D PLANIMETRY (MOD):  Normal Ranges:  EF-A4C View: 68.8 % (>=55%)  EF-A2C View: 53.4 %  EF-Biplane:  62.0 %    LV DIASTOLIC FUNCTION:  Normal Ranges:  MV Peak E:        0.98 m/s    (0.7-1.2 m/s)  MV Peak A:        1.00 m/s    (0.42-0.7 m/s)  E/A Ratio:        0.98        (1.0-2.2)  MV e'             0.08 m/s    (>8.0)  MV A Dur:         125.59 msec  E/e' Ratio:       12.25       (<8.0)  a'                0.10 m/s  PulmV Sys Jacques:    56.50 cm/s  PulmV Hong Jacques:   45.41 cm/s  PulmV S/D Jacques:    1.24  PulmV A Revs Jacques: 33.37 cm/s  PulmV A Revs Dur: 151.28 msec    MITRAL VALVE:  Normal Ranges:  MV DT: 194 msec (150-240msec)    AORTIC VALVE:  Normal Ranges:  AoV Vmax:      1.36 m/s (<=1.7m/s)  AoV Peak P.4 mmHg (<20mmHg)  LVOT Max Jacques:  1.31 m/s (<=1.1m/s)  LVOT VTI:      29.73 cm  LVOT Diameter: 2.03 cm  (1.8-2.4cm)  AoV Area,Vmax: 3.14 cm2 (2.5-4.5cm2)    RIGHT VENTRICLE:  RV 1   3.5 cm  RV 2   2.1 cm  RV 3   8.0 cm  TAPSE: 34.0 mm  RV s'  0.15 m/s    TRICUSPID VALVE/RVSP:  Normal Ranges:  Peak TR Velocity: 2.51 m/s  RV Syst Pressure: 28.1 mmHg (< 30mmHg)  IVC Diam:         1.60 cm    PULMONIC VALVE:  Normal Ranges:  PV Accel Time: 103 msec (>120ms)  PV Max Jacques:    0.9 m/s  (0.6-0.9m/s)  PV Max PG:     3.1 mmHg    Pulmonary Veins:  PulmV A Revs Dur: 151.28 msec  PulmV A Revs Jacques: 33.37 cm/s  PulmV Hong Jacques:   45.41 cm/s  PulmV S/D Jacques:    1.24  PulmV Sys Jacques:    56.50 cm/s    AORTA:  Asc Ao Diam 3.33 cm      [id number] Margo Long MD  Electronically signed on 2022 at 6:10:32 PM         Final        Chest CT Scan     No results found for this or any previous visit from the past 365 days.       Laboratory Studies     Lab Results   Component Value Date    WBC 7.1 2023    HGB 14.1 2023    HCT 43.4 2023    MCV 95 2023     2023      Lab Results  "  Component Value Date    GLUCOSE 95 11/01/2023    CALCIUM 9.2 11/01/2023     11/01/2023    K 4.2 11/01/2023    CO2 29 11/01/2023     11/01/2023    BUN 13 11/01/2023    CREATININE 0.94 11/01/2023      Lab Results   Component Value Date    ALT 14 11/01/2023    AST 17 11/01/2023    ALKPHOS 91 11/01/2023    BILITOT 0.6 11/01/2023        Sputum Culture     No results found for: \"AFBCX\"   No results found for: \"RESPCULTCYFI\"  No results found for the last 90 days.          Assessment and Plan / Recommendations     Pleasant 78-year-old female with history of asthma/COPD, stage IV lung adenocarcinoma presenting for follow-up.     1. Asthma/COPD reasonably controlled.    -Continue Trelegy, albuterol as needed  -Continue montelukast  -Up-to-date for flu, pneumonia and COVID vaccines, recommend RSV vaccine  -Started on Nucala by allergy, also following with ENT for chronic sinusitis with polyposis      2. Lung adenocarcinoma on Tagrisso, following with oncology.  CT from July with stable findings compared to March this year and November 2022.  Scheduled for follow-up CT in December     Return to clinic in 8 months or sooner with any concerns.     This dictation was created using voice recognition software. Phonetic and/or minor grammatical errors may exist.           Agustina Marie MD   11/13/2023    "

## 2023-11-28 ENCOUNTER — SPECIALTY PHARMACY (OUTPATIENT)
Dept: PHARMACY | Facility: CLINIC | Age: 78
End: 2023-11-28

## 2023-11-28 ENCOUNTER — TELEPHONE (OUTPATIENT)
Dept: ADMISSION | Facility: HOSPITAL | Age: 78
End: 2023-11-28
Payer: COMMERCIAL

## 2023-11-29 ENCOUNTER — PHARMACY VISIT (OUTPATIENT)
Dept: PHARMACY | Facility: CLINIC | Age: 78
End: 2023-11-29
Payer: COMMERCIAL

## 2023-11-29 VITALS — HEIGHT: 69 IN | WEIGHT: 226.63 LBS | BODY MASS INDEX: 33.57 KG/M2

## 2023-11-29 PROCEDURE — RXMED WILLOW AMBULATORY MEDICATION CHARGE

## 2023-11-30 NOTE — PROGRESS NOTES
Patient ID: Tracey Chiu is a 78 y.o. female.      Diagnosis  1. Lung cancer (CMS/HCC)  Comprehensive Metabolic Panel    CBC and Auto Differential    CBC and Auto Differential    Comprehensive Metabolic Panel    CT chest w IV contrast    Clinic Appointment Request [facility]           Patient Visit Information:   Visit Type: Follow Up Visit      Cancer History:   Treatment Synopsis:       DIAGNOSIS     Adenocarcinoma of the lung.  Date of diagnosis is March 3 of 2022 where a posterior pleural biopsy demonstrated adenocarcinoma.  Immunohistochemistry positive for CK7 and TTF-1 1.        STAGING     T4 (Mediastinal fat invasion  seen by direct observation during thoracoscopy) N0, M1 a (left pleural involvement)        CURRENT SITES OF DISEASE     ELIZABETH  with mediastinal fat invasion, left pleura        MOLECULAR GENOMICS     PD-L1 immunohistochemistry tumor proportional score 20%  Next generation sequencing using [facility] solid tumor panel  EGFR L858R mutation positive        SERUM TUMOR MARKER     Normal CEA and CA 19.9 in March 2022        PRIOR THERAPY     1-   Left pleural talc pleurodesis dated March 3, 2022        CURRENT THERAPY     Single agent Tagrisso, started April 14, 2022. Documented response with resolution of left pleural effusion and reduction of left upper lobe lesion in June 2022        CURRENT ONCOLOGICAL PROBLEMS     1-  peripancreatic inflammation/stranding seen on imaging suggestive of pancreatitis, possibly alcohol related.This was seen at time of diagnosis.  Continue to follow pancreatic imaging consider referral for pancreatic evaluation.  MRI of the pancreas  done on July 19, 2022.  Refer to pancreatic surgery for evaluation.  Now followed by Dr. Calderon  2- hospitalized 11/22 for COPD exacerbation        HISTORY OF PRESENT ILLNESS     This is a 77-year-old patient.  She had a fall in October 2021 where she found herself falling down many stairs.  There was apparent loss of  consciousness according to the daughter. She was admitted to the hospital and underwent a posterior cervical fusion  for fracture on October 28, 2021.  Reportedly at that time a lung lesion was found and imaging studies including a CT scan was done demonstrating a lesion abutting the left upper lobe.  Upon recovery from her neck surgery further work-up was performed.  A combined PET/CT dated February a 2022 showed hypermetabolic activity in the left upper lobe mass abutting the mediastinum with no mediastinal lymphadenopathy.  There was some fat stranding and soft tissue fullness along the pancreatic head and proximal  body with mild hypermetabolic activity suggestive of pancreatitis.  There was intense hypermetabolic activity identified within the ethmoid sinuses and to a lesser extent the left maxillary antrum consistent with sinusitis. He had a CT scan of the chest  on March 1 of 2022 showing a left upper lobe mass abutting the left side of the upper mediastinum measuring 3.6 cm in greatest dimension.  There was a small to moderate left pleural effusion which had increased in size compared to the PET/CT of February 8 of 2022 and it was new compared to the CT scan of the chest of October 28 of 2021.  There was scattered bilateral pulmonary nodules however most of these were only a few millimeters and nonsignificant and unchanged compared to October.  There was also  a 2.8 cm hypodense area in the pancreatic head and uncinate process.  There was some mild stranding of the peripancreatic fat which was thought to be related to inflammation around the pancreas.  There was a compression fracture of L1 vertebral body that  was stable. She was admitted to the hospital from March 3 to March 5, 2022 for a video-assisted thorascopic pleural biopsy and talc pleurodesis.Operative report from March 3, 2022 states that about 100 cc of serosanguineous fluid was removed.  There were  small subtle pleural nodules within the  posterior inferior part of the pleural that was seen along with diffuse thickening of the pleura anterolaterally which was biopsied.  The tumor seemed to invade the mediastinal fat by inspection.  Talc was inserted  after frozen sections demonstrated malignancy.        PAST MEDICAL HISTORY     1-  nasal polyps s/p surgery in November 2021  2-  hypertension  3-  follow-up in October 2021 resulting in cervical vertebral body fracture s/p cervical posterior fusion on October 28, 2021  4-  pancreatitis?  Seen on PET and CT imaging of February and March 2022.  Pancreatic inflammation is stranding in the setting of possible alcohol abuse.  5-   L4 compression vertebral body fracture seen on imaging studies since February 2022        SOCIAL HISTORY     Patient generally lives in [place] on her own.  She is currently 8 being with her significant other/friend in [facility].  She has 1 daughter Didi who is an  in [place].  He started smoking at the age of 18.  She stopped in 1994.   She smoked for 25 years 1 pack a day.  She is retired.  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  Patient and daughter report that she has several glasses of wine at night.        CURRENT MEDS     See medication list, meds reviewed        ALLERGIES     patient is allergic to aspirin and has nasal polyps associated with this        FAMILY HISTORY     Patient's father had prostate cancer at the age of 70 patient's mother had ovarian cancer at the age of 86 and patient's brother also had prostate cancer at age 68        Subjective    Today I am meeting with Tracey Chiu.  She reports that she is doing quite well on her tagrisso, which she has been on since April, 2022.  She notes that her nails and toe nails are very friable, and 1 toenail was bleeding- this seems to come and goe.  She has some loose stools occasionally - it also comes and goes and she is unclear  "if it is related to something she is eating.  She feels well overall.  Had a recent infection with Covid which took a lot out of her.  She is active- energy is so-so.            Objective    BSA: 2.23 meters squared  /72   Pulse 71   Temp 36.1 °C (97 °F) (Skin)   Resp 17   Ht (S) 1.738 m (5' 8.43\")   Wt 103 kg (226 lb 4.8 oz)   LMP  (LMP Unknown)   SpO2 98%   BMI 33.98 kg/m²      Physical Exam  HENT:      Head: Normocephalic and atraumatic.      Mouth/Throat:      Mouth: Mucous membranes are moist.      Pharynx: Oropharynx is clear.   Eyes:      Pupils: Pupils are equal, round, and reactive to light.   Cardiovascular:      Rate and Rhythm: Normal rate and regular rhythm.   Pulmonary:      Effort: Pulmonary effort is normal.   Abdominal:      Palpations: Abdomen is soft.   Musculoskeletal:         General: Normal range of motion.      Cervical back: Normal range of motion.   Skin:     General: Skin is warm.   Neurological:      General: No focal deficit present.      Mental Status: She is alert.         Performance Status:  Asymptomatic      Assessment/Plan   Tracey has been on Tagrisso for 1.5 years, and has mild side effects of nail chipping and some paronychia, and mild diarrhea. Safety labs are good and she does not require a dose reduction.      We will see her back in 2 months with repeat labs and scan.  We will reorder her Tagrisso.  She is in agreement with the plan.          Cancer Staging   No matching staging information was found for the patient.    Oncology History   Lung cancer (CMS/HCC)   8/7/2023 Initial Diagnosis    Lung cancer (CMS/HCC)     11/29/2023 -  Chemotherapy    Osimertinib, 28 Day Cycles                   JONATHON Allison-CNS          "

## 2023-12-05 DIAGNOSIS — R93.89 ABNORMAL CT SCAN: ICD-10-CM

## 2023-12-05 DIAGNOSIS — Q45.3 PANCREATIC DUCTAL ABNORMALITY: ICD-10-CM

## 2023-12-05 DIAGNOSIS — D37.8 INTRADUCTAL PAPILLARY MUCINOUS TUMOR OF UNCERTAIN BEHAVIOR OF PANCREAS: Primary | ICD-10-CM

## 2023-12-05 DIAGNOSIS — K86.2 PANCREAS CYST (HHS-HCC): ICD-10-CM

## 2023-12-12 ENCOUNTER — OFFICE VISIT (OUTPATIENT)
Dept: PRIMARY CARE | Facility: CLINIC | Age: 78
End: 2023-12-12
Payer: MEDICARE

## 2023-12-12 VITALS
TEMPERATURE: 98 F | WEIGHT: 224 LBS | HEART RATE: 68 BPM | OXYGEN SATURATION: 98 % | BODY MASS INDEX: 33.33 KG/M2 | DIASTOLIC BLOOD PRESSURE: 74 MMHG | SYSTOLIC BLOOD PRESSURE: 116 MMHG

## 2023-12-12 DIAGNOSIS — I10 HYPERTENSION, UNSPECIFIED TYPE: ICD-10-CM

## 2023-12-12 DIAGNOSIS — Z12.31 SCREENING MAMMOGRAM FOR BREAST CANCER: ICD-10-CM

## 2023-12-12 DIAGNOSIS — D37.8 INTRADUCTAL PAPILLARY MUCINOUS TUMOR OF UNCERTAIN BEHAVIOR OF PANCREAS: ICD-10-CM

## 2023-12-12 DIAGNOSIS — I48.20 CHRONIC ATRIAL FIBRILLATION (MULTI): Primary | ICD-10-CM

## 2023-12-12 DIAGNOSIS — C34.90 MALIGNANT NEOPLASM OF LUNG, UNSPECIFIED LATERALITY, UNSPECIFIED PART OF LUNG (MULTI): ICD-10-CM

## 2023-12-12 PROBLEM — R91.8 LUNG MASS: Status: RESOLVED | Noted: 2023-10-06 | Resolved: 2023-12-12

## 2023-12-12 PROBLEM — J44.1 COPD EXACERBATION (MULTI): Status: RESOLVED | Noted: 2023-08-07 | Resolved: 2023-12-12

## 2023-12-12 PROBLEM — R93.89 ABNORMAL CT SCAN: Status: RESOLVED | Noted: 2023-10-06 | Resolved: 2023-12-12

## 2023-12-12 PROBLEM — A41.9 SEPSIS (MULTI): Status: RESOLVED | Noted: 2023-10-06 | Resolved: 2023-12-12

## 2023-12-12 PROCEDURE — 99213 OFFICE O/P EST LOW 20 MIN: CPT | Performed by: PEDIATRICS

## 2023-12-12 PROCEDURE — 1160F RVW MEDS BY RX/DR IN RCRD: CPT | Performed by: PEDIATRICS

## 2023-12-12 PROCEDURE — 1036F TOBACCO NON-USER: CPT | Performed by: PEDIATRICS

## 2023-12-12 PROCEDURE — 1159F MED LIST DOCD IN RCRD: CPT | Performed by: PEDIATRICS

## 2023-12-12 PROCEDURE — 3074F SYST BP LT 130 MM HG: CPT | Performed by: PEDIATRICS

## 2023-12-12 PROCEDURE — 3078F DIAST BP <80 MM HG: CPT | Performed by: PEDIATRICS

## 2023-12-12 PROCEDURE — 1126F AMNT PAIN NOTED NONE PRSNT: CPT | Performed by: PEDIATRICS

## 2023-12-12 NOTE — PROGRESS NOTES
Subjective   Patient ID: Tracey Chiu is a 78 y.o. female who presents for check up    HPI   Patient declines Cologuard; still needs mammogram; dexa normal in 2023; intends to get cholesterol done soon. Had flu shot at allergist's office.  Review of Systems  No CP or SOB; stubbed toe and podiatrist said it was broken; it is levi taped  Objective   /74   Pulse 68   Temp 36.7 °C (98 °F)   Wt 102 kg (224 lb)   LMP  (LMP Unknown)   SpO2 98%   BMI 33.33 kg/m²     Physical Exam  Vitals reviewed.   Constitutional:       General: She is not in acute distress.  HENT:      Head: Normocephalic.      Right Ear: Tympanic membrane normal.      Left Ear: Tympanic membrane normal.      Nose: Nose normal.      Mouth/Throat:      Pharynx: Oropharynx is clear.   Cardiovascular:      Rate and Rhythm: Normal rate and regular rhythm.      Heart sounds: Normal heart sounds.   Pulmonary:      Breath sounds: Normal breath sounds.   Abdominal:      Palpations: Abdomen is soft.      Tenderness: There is no abdominal tenderness.   Musculoskeletal:         General: No tenderness.      Comments: Stiff neck    Skin:     Findings: No rash.   Neurological:      General: No focal deficit present.      Mental Status: She is alert.   Psychiatric:         Mood and Affect: Mood normal.         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

## 2023-12-13 ENCOUNTER — TELEPHONE (OUTPATIENT)
Dept: SURGICAL ONCOLOGY | Facility: CLINIC | Age: 78
End: 2023-12-13
Payer: COMMERCIAL

## 2023-12-13 NOTE — TELEPHONE ENCOUNTER
Called Mrs. Chiu to remind her that she is due in January for a surveillance MRCP. Order placed. I left a voicemail.     Luisa Del Cid PA-C.  Surgical Oncology.

## 2023-12-22 DIAGNOSIS — C34.90 MALIGNANT NEOPLASM OF LUNG, UNSPECIFIED LATERALITY, UNSPECIFIED PART OF LUNG (MULTI): Primary | ICD-10-CM

## 2023-12-28 ENCOUNTER — LAB (OUTPATIENT)
Dept: LAB | Facility: CLINIC | Age: 78
End: 2023-12-28
Payer: MEDICARE

## 2023-12-28 ENCOUNTER — SPECIALTY PHARMACY (OUTPATIENT)
Dept: PHARMACY | Facility: CLINIC | Age: 78
End: 2023-12-28

## 2023-12-28 ENCOUNTER — ANCILLARY PROCEDURE (OUTPATIENT)
Dept: RADIOLOGY | Facility: CLINIC | Age: 78
End: 2023-12-28
Payer: MEDICARE

## 2023-12-28 DIAGNOSIS — C34.90 LUNG CANCER (MULTI): Primary | ICD-10-CM

## 2023-12-28 DIAGNOSIS — C34.90 MALIGNANT NEOPLASM OF LUNG, UNSPECIFIED LATERALITY, UNSPECIFIED PART OF LUNG (MULTI): ICD-10-CM

## 2023-12-28 DIAGNOSIS — C34.90 LUNG CANCER (MULTI): ICD-10-CM

## 2023-12-28 DIAGNOSIS — D37.8 INTRADUCTAL PAPILLARY MUCINOUS TUMOR OF UNCERTAIN BEHAVIOR OF PANCREAS: ICD-10-CM

## 2023-12-28 DIAGNOSIS — Q45.3 PANCREATIC DUCTAL ABNORMALITY: ICD-10-CM

## 2023-12-28 DIAGNOSIS — C34.90 MALIGNANT NEOPLASM OF UNSPECIFIED PART OF UNSPECIFIED BRONCHUS OR LUNG (MULTI): Primary | ICD-10-CM

## 2023-12-28 DIAGNOSIS — E66.09 CLASS 1 OBESITY DUE TO EXCESS CALORIES WITH SERIOUS COMORBIDITY AND BODY MASS INDEX (BMI) OF 33.0 TO 33.9 IN ADULT: ICD-10-CM

## 2023-12-28 DIAGNOSIS — R93.89 ABNORMAL CT SCAN: ICD-10-CM

## 2023-12-28 DIAGNOSIS — K86.2 PANCREAS CYST (HHS-HCC): ICD-10-CM

## 2023-12-28 LAB
BASOPHILS # BLD AUTO: 0.02 X10*3/UL (ref 0–0.1)
BASOPHILS NFR BLD AUTO: 0.4 %
CREAT SERPL-MCNC: 0.9 MG/DL (ref 0.6–1.3)
EOSINOPHIL # BLD AUTO: 0.06 X10*3/UL (ref 0–0.4)
EOSINOPHIL NFR BLD AUTO: 1.2 %
ERYTHROCYTE [DISTWIDTH] IN BLOOD BY AUTOMATED COUNT: 13.7 % (ref 11.5–14.5)
GFR SERPL CREATININE-BSD FRML MDRD: 66 ML/MIN/1.73M*2
HCT VFR BLD AUTO: 42.9 % (ref 36–46)
HGB BLD-MCNC: 13.9 G/DL (ref 12–16)
IMM GRANULOCYTES # BLD AUTO: 0 X10*3/UL (ref 0–0.5)
IMM GRANULOCYTES NFR BLD AUTO: 0 % (ref 0–0.9)
LYMPHOCYTES # BLD AUTO: 1.53 X10*3/UL (ref 0.8–3)
LYMPHOCYTES NFR BLD AUTO: 31 %
MCH RBC QN AUTO: 31.7 PG (ref 26–34)
MCHC RBC AUTO-ENTMCNC: 32.4 G/DL (ref 32–36)
MCV RBC AUTO: 98 FL (ref 80–100)
MONOCYTES # BLD AUTO: 0.62 X10*3/UL (ref 0.05–0.8)
MONOCYTES NFR BLD AUTO: 12.6 %
NEUTROPHILS # BLD AUTO: 2.7 X10*3/UL (ref 1.6–5.5)
NEUTROPHILS NFR BLD AUTO: 54.8 %
NRBC BLD-RTO: NORMAL /100{WBCS}
PLATELET # BLD AUTO: 160 X10*3/UL (ref 150–450)
RBC # BLD AUTO: 4.39 X10*6/UL (ref 4–5.2)
WBC # BLD AUTO: 4.9 X10*3/UL (ref 4.4–11.3)

## 2023-12-28 PROCEDURE — 71260 CT THORAX DX C+: CPT

## 2023-12-28 PROCEDURE — 86301 IMMUNOASSAY TUMOR CA 19-9: CPT

## 2023-12-28 PROCEDURE — 82565 ASSAY OF CREATININE: CPT

## 2023-12-28 PROCEDURE — 71260 CT THORAX DX C+: CPT | Performed by: RADIOLOGY

## 2023-12-28 PROCEDURE — 82306 VITAMIN D 25 HYDROXY: CPT

## 2023-12-28 PROCEDURE — 85025 COMPLETE CBC W/AUTO DIFF WBC: CPT

## 2023-12-28 PROCEDURE — 80061 LIPID PANEL: CPT

## 2023-12-28 PROCEDURE — 83735 ASSAY OF MAGNESIUM: CPT

## 2023-12-28 PROCEDURE — 36415 COLL VENOUS BLD VENIPUNCTURE: CPT

## 2023-12-28 PROCEDURE — 2550000001 HC RX 255 CONTRASTS: Performed by: CLINICAL NURSE SPECIALIST

## 2023-12-28 RX ORDER — OSIMERTINIB 80 1/1
80 TABLET, FILM COATED ORAL DAILY
Qty: 30 TABLET | Refills: 3 | Status: CANCELLED | OUTPATIENT
Start: 2023-12-28 | End: 2024-04-26

## 2023-12-28 RX ADMIN — IOHEXOL 50 ML: 350 INJECTION, SOLUTION INTRAVENOUS at 15:17

## 2023-12-29 DIAGNOSIS — C34.90 MALIGNANT NEOPLASM OF LUNG, UNSPECIFIED LATERALITY, UNSPECIFIED PART OF LUNG (MULTI): Primary | ICD-10-CM

## 2023-12-29 LAB
25(OH)D3 SERPL-MCNC: 59 NG/ML (ref 30–100)
ALBUMIN SERPL BCP-MCNC: 4 G/DL (ref 3.4–5)
ALP SERPL-CCNC: 94 U/L (ref 33–136)
ALT SERPL W P-5'-P-CCNC: 16 U/L (ref 7–45)
ANION GAP SERPL CALC-SCNC: 15 MMOL/L (ref 10–20)
AST SERPL W P-5'-P-CCNC: 19 U/L (ref 9–39)
BILIRUB SERPL-MCNC: 0.7 MG/DL (ref 0–1.2)
BUN SERPL-MCNC: 16 MG/DL (ref 6–23)
CALCIUM SERPL-MCNC: 9.6 MG/DL (ref 8.6–10.6)
CANCER AG19-9 SERPL-ACNC: 19.41 U/ML
CHLORIDE SERPL-SCNC: 105 MMOL/L (ref 98–107)
CHOLEST SERPL-MCNC: 218 MG/DL (ref 0–199)
CHOLESTEROL/HDL RATIO: 2.8
CO2 SERPL-SCNC: 28 MMOL/L (ref 21–32)
CREAT SERPL-MCNC: 0.82 MG/DL (ref 0.5–1.05)
GFR SERPL CREATININE-BSD FRML MDRD: 74 ML/MIN/1.73M*2
GLUCOSE SERPL-MCNC: 88 MG/DL (ref 74–99)
HDLC SERPL-MCNC: 78.9 MG/DL
LDLC SERPL CALC-MCNC: 122 MG/DL
MAGNESIUM SERPL-MCNC: 2.23 MG/DL (ref 1.6–2.4)
NON HDL CHOLESTEROL: 139 MG/DL (ref 0–149)
POTASSIUM SERPL-SCNC: 4.2 MMOL/L (ref 3.5–5.3)
PROT SERPL-MCNC: 6.6 G/DL (ref 6.4–8.2)
SODIUM SERPL-SCNC: 144 MMOL/L (ref 136–145)
TRIGL SERPL-MCNC: 87 MG/DL (ref 0–149)
VLDL: 17 MG/DL (ref 0–40)

## 2023-12-29 NOTE — TELEPHONE ENCOUNTER
Patient calling in today to check the status of Tagrisso prescription. She states she has a one week supply left.

## 2024-01-02 PROCEDURE — RXMED WILLOW AMBULATORY MEDICATION CHARGE

## 2024-01-03 ENCOUNTER — PHARMACY VISIT (OUTPATIENT)
Dept: PHARMACY | Facility: CLINIC | Age: 79
End: 2024-01-03
Payer: COMMERCIAL

## 2024-01-08 ENCOUNTER — OFFICE VISIT (OUTPATIENT)
Dept: HEMATOLOGY/ONCOLOGY | Facility: HOSPITAL | Age: 79
End: 2024-01-08
Payer: MEDICARE

## 2024-01-08 VITALS
BODY MASS INDEX: 33.04 KG/M2 | OXYGEN SATURATION: 100 % | DIASTOLIC BLOOD PRESSURE: 68 MMHG | WEIGHT: 223.11 LBS | TEMPERATURE: 97.9 F | RESPIRATION RATE: 20 BRPM | SYSTOLIC BLOOD PRESSURE: 120 MMHG | HEIGHT: 69 IN | HEART RATE: 66 BPM

## 2024-01-08 DIAGNOSIS — C34.90 LUNG CANCER (MULTI): ICD-10-CM

## 2024-01-08 PROCEDURE — 99215 OFFICE O/P EST HI 40 MIN: CPT | Performed by: INTERNAL MEDICINE

## 2024-01-08 PROCEDURE — 1126F AMNT PAIN NOTED NONE PRSNT: CPT | Performed by: INTERNAL MEDICINE

## 2024-01-08 PROCEDURE — 1159F MED LIST DOCD IN RCRD: CPT | Performed by: INTERNAL MEDICINE

## 2024-01-08 PROCEDURE — 3078F DIAST BP <80 MM HG: CPT | Performed by: INTERNAL MEDICINE

## 2024-01-08 PROCEDURE — 3074F SYST BP LT 130 MM HG: CPT | Performed by: INTERNAL MEDICINE

## 2024-01-08 PROCEDURE — 1036F TOBACCO NON-USER: CPT | Performed by: INTERNAL MEDICINE

## 2024-01-08 ASSESSMENT — ENCOUNTER SYMPTOMS
ARTHRALGIAS: 0
NAUSEA: 0
VOMITING: 0
VERTIGO: 0
FEVER: 0
SWOLLEN GLANDS: 0
DIAPHORESIS: 0
CHANGE IN BOWEL HABIT: 0
MYALGIAS: 0
NECK PAIN: 1
ABDOMINAL PAIN: 0
NUMBNESS: 0
FATIGUE: 0
HEADACHES: 0
VISUAL CHANGE: 0
SORE THROAT: 0
COUGH: 0
JOINT SWELLING: 0
WEAKNESS: 0
ANOREXIA: 0
CHILLS: 0

## 2024-01-08 ASSESSMENT — PAIN SCALES - GENERAL: PAINLEVEL: 0-NO PAIN

## 2024-01-08 NOTE — PROGRESS NOTES
Patient ID: Tracey Chiu is a 78 y.o. female.    DIAGNOSIS     Adenocarcinoma of the lung.  Date of diagnosis is March 3 of 2022 where a posterior pleural biopsy demonstrated adenocarcinoma.  Immunohistochemistry positive for CK7 and TTF-1 1.        STAGING     T4 (Mediastinal fat invasion  seen by direct observation during thoracoscopy) N0, M1 a (left pleural involvement)        CURRENT SITES OF DISEASE     ELIZABETH  with mediastinal fat invasion, left pleura        MOLECULAR GENOMICS     PD-L1 immunohistochemistry tumor proportional score 20%  Next generation sequencing using [facility] solid tumor panel  EGFR L858R mutation positive        SERUM TUMOR MARKER     Normal CEA and CA 19.9 in March 2022        PRIOR THERAPY     1-   Left pleural talc pleurodesis dated March 3, 2022        CURRENT THERAPY     Single agent Tagrisso, started April 14, 2022. Documented response with resolution of left pleural effusion and reduction of left upper lobe lesion in June 2022        CURRENT ONCOLOGICAL PROBLEMS     1-  peripancreatic inflammation/stranding seen on imaging suggestive of pancreatitis, possibly alcohol related.This was seen at time of diagnosis.  Continue to follow pancreatic imaging consider referral for pancreatic evaluation.  MRI of the pancreas  done on July 19, 2022.  Refer to pancreatic surgery for evaluation.  Now followed by Dr. Calderon  2- hospitalized 11/22 for COPD exacerbation        HISTORY OF PRESENT ILLNESS     This is a 77-year-old patient.  She had a fall in October 2021 where she found herself falling down many stairs.  There was apparent loss of consciousness according to the daughter. She was admitted to the hospital and underwent a posterior cervical fusion  for fracture on October 28, 2021.  Reportedly at that time a lung lesion was found and imaging studies including a CT scan was done demonstrating a lesion abutting the left upper lobe.  Upon recovery from her neck surgery further  work-up was performed.  A combined PET/CT dated February a 2022 showed hypermetabolic activity in the left upper lobe mass abutting the mediastinum with no mediastinal lymphadenopathy.  There was some fat stranding and soft tissue fullness along the pancreatic head and proximal  body with mild hypermetabolic activity suggestive of pancreatitis.  There was intense hypermetabolic activity identified within the ethmoid sinuses and to a lesser extent the left maxillary antrum consistent with sinusitis. He had a CT scan of the chest  on March 1 of 2022 showing a left upper lobe mass abutting the left side of the upper mediastinum measuring 3.6 cm in greatest dimension.  There was a small to moderate left pleural effusion which had increased in size compared to the PET/CT of February 8 of 2022 and it was new compared to the CT scan of the chest of October 28 of 2021.  There was scattered bilateral pulmonary nodules however most of these were only a few millimeters and nonsignificant and unchanged compared to October.  There was also  a 2.8 cm hypodense area in the pancreatic head and uncinate process.  There was some mild stranding of the peripancreatic fat which was thought to be related to inflammation around the pancreas.  There was a compression fracture of L1 vertebral body that  was stable. She was admitted to the hospital from March 3 to March 5, 2022 for a video-assisted thorascopic pleural biopsy and talc pleurodesis.Operative report from March 3, 2022 states that about 100 cc of serosanguineous fluid was removed.  There were  small subtle pleural nodules within the posterior inferior part of the pleural that was seen along with diffuse thickening of the pleura anterolaterally which was biopsied.  The tumor seemed to invade the mediastinal fat by inspection.  Talc was inserted  after frozen sections demonstrated malignancy.        PAST MEDICAL HISTORY     1-  nasal polyps s/p surgery in November 2021  2-   hypertension  3-  follow-up in October 2021 resulting in cervical vertebral body fracture s/p cervical posterior fusion on October 28, 2021  4-  pancreatitis?  Seen on PET and CT imaging of February and March 2022.  Pancreatic inflammation is stranding in the setting of possible alcohol abuse.  5-   L4 compression vertebral body fracture seen on imaging studies since February 2022        SOCIAL HISTORY     Patient generally lives in [place] on her own.  She is currently 8 being with her significant other/friend in [facility].  She has 1 daughter Didi who is an  in [place].  He started smoking at the age of 18.  She stopped in 1994.   She smoked for 25 years 1 pack a day.  She is retired.  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  Patient and daughter report that she has several glasses of wine at night.        CURRENT MEDS     See medication list, meds reviewed        ALLERGIES     patient is allergic to aspirin and has nasal polyps associated with this        FAMILY HISTORY     Patient's father had prostate cancer at the age of 70 patient's mother had ovarian cancer at the age of 86 and patient's brother also had prostate cancer at age 68       Subjective    Cancer  Associated symptoms include neck pain. Pertinent negatives include no abdominal pain, anorexia, arthralgias, change in bowel habit, chest pain, chills, congestion, coughing, diaphoresis, fatigue, fever, headaches, joint swelling, myalgias, nausea, numbness, rash, sore throat, swollen glands, urinary symptoms, vertigo, visual change, vomiting or weakness.     Patient is overall doing well.  She has a rare occasional cough with clear phlegm but nothing new.  Has an occasional diarrhea up to 2 times per day.  No nausea no vomiting no increasing shortness of breath.    Objective    BSA: 2.21 meters squared  /68 (BP Location: Left arm, Patient Position: Sitting, BP  "Cuff Size: Large adult)   Pulse 66   Temp 36.6 °C (97.9 °F) (Temporal)   Resp 20   Ht (S) 1.743 m (5' 8.62\")   Wt 101 kg (223 lb 1.7 oz)   LMP  (LMP Unknown)   SpO2 100%   BMI 33.31 kg/m²      Physical Exam  Constitutional:       Appearance: Normal appearance. She is normal weight.   HENT:      Mouth/Throat:      Mouth: Mucous membranes are moist.      Pharynx: Oropharynx is clear. No oropharyngeal exudate or posterior oropharyngeal erythema.   Eyes:      General: No scleral icterus.     Conjunctiva/sclera: Conjunctivae normal.   Cardiovascular:      Rate and Rhythm: Normal rate and regular rhythm.      Pulses: Normal pulses.      Heart sounds: Normal heart sounds. No murmur heard.     No friction rub. No gallop.   Pulmonary:      Effort: Pulmonary effort is normal. No respiratory distress.      Breath sounds: Normal breath sounds. No stridor. No wheezing, rhonchi or rales.   Abdominal:      General: Abdomen is flat. Bowel sounds are normal. There is no distension.      Palpations: Abdomen is soft. There is no mass.      Tenderness: There is no abdominal tenderness. There is no guarding or rebound.   Musculoskeletal:      Right lower leg: No edema.      Left lower leg: No edema.   Lymphadenopathy:      Cervical: No cervical adenopathy.   Skin:     General: Skin is warm.      Coloration: Skin is not jaundiced or pale.      Findings: No bruising or erythema.   Neurological:      General: No focal deficit present.      Mental Status: She is oriented to person, place, and time.   Psychiatric:         Mood and Affect: Mood normal.         Behavior: Behavior normal.         Performance Status:  Asymptomatic     Latest Reference Range & Units 12/28/23 14:18   GLUCOSE 74 - 99 mg/dL 88   SODIUM 136 - 145 mmol/L 144   POTASSIUM 3.5 - 5.3 mmol/L 4.2   CHLORIDE 98 - 107 mmol/L 105   Bicarbonate 21 - 32 mmol/L 28   Anion Gap 10 - 20 mmol/L 15   Blood Urea Nitrogen 6 - 23 mg/dL 16   Creatinine 0.50 - 1.05 mg/dL 0.82 "   EGFR >60 mL/min/1.73m*2 74   Calcium 8.6 - 10.6 mg/dL 9.6   Albumin 3.4 - 5.0 g/dL 4.0   Alkaline Phosphatase 33 - 136 U/L 94   ALT 7 - 45 U/L 16   AST 9 - 39 U/L 19   Bilirubin Total 0.0 - 1.2 mg/dL 0.7   HDL CHOLESTEROL mg/dL 78.9   Cholesterol/HDL Ratio  2.8   LDL Calculated <=99 mg/dL 122 (H)   VLDL 0 - 40 mg/dL 17   TRIGLYCERIDES 0 - 149 mg/dL 87   Non HDL Cholesterol 0 - 149 mg/dL 139   Total Protein 6.4 - 8.2 g/dL 6.6   MAGNESIUM 1.60 - 2.40 mg/dL 2.23   CHOLESTEROL 0 - 199 mg/dL 218 (H)   Vitamin D, 25-Hydroxy, Total 30 - 100 ng/mL 59   Cancer AG 19-9 <35.00 U/mL 19.41   WBC 4.4 - 11.3 x10*3/uL 4.9   nRBC  COMMENT ONLY   RBC 4.00 - 5.20 x10*6/uL 4.39   HEMOGLOBIN 12.0 - 16.0 g/dL 13.9   HEMATOCRIT 36.0 - 46.0 % 42.9   MCV 80 - 100 fL 98   MCH 26.0 - 34.0 pg 31.7   MCHC 32.0 - 36.0 g/dL 32.4   RED CELL DISTRIBUTION WIDTH 11.5 - 14.5 % 13.7   Platelets 150 - 450 x10*3/uL 160   Neutrophils % 40.0 - 80.0 % 54.8   Immature Granulocytes %, Automated 0.0 - 0.9 % 0.0   Lymphocytes % 13.0 - 44.0 % 31.0   Monocytes % 2.0 - 10.0 % 12.6   Eosinophils % 0.0 - 6.0 % 1.2   Basophils % 0.0 - 2.0 % 0.4   Neutrophils Absolute 1.60 - 5.50 x10*3/uL 2.70   Immature Granulocytes Absolute, Automated 0.00 - 0.50 x10*3/uL 0.00   Lymphocytes Absolute 0.80 - 3.00 x10*3/uL 1.53   Monocytes Absolute 0.05 - 0.80 x10*3/uL 0.62   Eosinophils Absolute 0.00 - 0.40 x10*3/uL 0.06   Basophils Absolute 0.00 - 0.10 x10*3/uL 0.02   POCT Creatinine 0.6 - 1.3 mg/dL 0.9   POCT eGFR >=60 mL/min/1.73m*2 66   (H): Data is abnormally high    CT Chest 12/28/2023  IMPRESSION:  1. Mild interval increase in size in the left upper lobe  paramediastinal mass with stable bilateral solid noncalcified  pulmonary nodules as described above. No new suspicious pulmonary  nodules. No thoracic lymphadenopathy.  2. Stable multi-cystic septated lesion in the pancreatic head  compared to MRI pancreas dated 07/05/2020.  3. Partially visualized cholelithiasis without  evidence of  cholecystitis.  4. Additional stable findings as described above.      Assessment/Plan     This is a very nice 78-year-old patient with EGFR mutation positive, exon 21 adenocarcinoma of the lung, stage Boom disease with left pleural involvement who had an excellent response to single agent Tagrisso.  She has been on this agent since April 2022.  I personally reviewed her most recent CT scan that shows no evidence of disease progression.  She has some mild loose stools up to 2 to 3/day but nothing more significant.  Otherwise tolerating treatment well.  Will see her back in 3 months time with blood work.    Cancer Staging   No matching staging information was found for the patient.    Oncology History   Lung cancer (CMS/HCC)   8/7/2023 Initial Diagnosis    Lung cancer (CMS/HCC)     11/29/2023 -  Chemotherapy    Osimertinib, 28 Day Cycles                   Mega Angel MD

## 2024-01-09 ENCOUNTER — TELEPHONE (OUTPATIENT)
Dept: SURGICAL ONCOLOGY | Facility: CLINIC | Age: 79
End: 2024-01-09
Payer: COMMERCIAL

## 2024-01-09 NOTE — TELEPHONE ENCOUNTER
Called to discuss surveillance imaging. Left a voicemail.    Luisa Del Cid PA-C  Surgical Oncology

## 2024-01-16 DIAGNOSIS — I10 HYPERTENSION, UNSPECIFIED TYPE: ICD-10-CM

## 2024-01-18 RX ORDER — METOPROLOL SUCCINATE 50 MG/1
50 TABLET, EXTENDED RELEASE ORAL DAILY
Qty: 90 TABLET | Refills: 1 | Status: SHIPPED | OUTPATIENT
Start: 2024-01-18

## 2024-01-30 ENCOUNTER — SPECIALTY PHARMACY (OUTPATIENT)
Dept: PHARMACY | Facility: CLINIC | Age: 79
End: 2024-01-30

## 2024-01-30 PROCEDURE — RXMED WILLOW AMBULATORY MEDICATION CHARGE

## 2024-01-31 ENCOUNTER — PHARMACY VISIT (OUTPATIENT)
Dept: PHARMACY | Facility: CLINIC | Age: 79
End: 2024-01-31
Payer: COMMERCIAL

## 2024-02-07 ENCOUNTER — APPOINTMENT (OUTPATIENT)
Dept: RADIOLOGY | Facility: CLINIC | Age: 79
End: 2024-02-07
Payer: MEDICARE

## 2024-02-07 ENCOUNTER — HOSPITAL ENCOUNTER (OUTPATIENT)
Dept: RADIOLOGY | Facility: CLINIC | Age: 79
Discharge: HOME | End: 2024-02-07
Payer: MEDICARE

## 2024-02-07 VITALS — WEIGHT: 222.66 LBS | HEIGHT: 69 IN | BODY MASS INDEX: 32.98 KG/M2

## 2024-02-07 DIAGNOSIS — Z12.31 SCREENING MAMMOGRAM FOR BREAST CANCER: ICD-10-CM

## 2024-02-07 PROCEDURE — 77063 BREAST TOMOSYNTHESIS BI: CPT

## 2024-02-07 PROCEDURE — 77067 SCR MAMMO BI INCL CAD: CPT | Performed by: RADIOLOGY

## 2024-02-07 PROCEDURE — 77063 BREAST TOMOSYNTHESIS BI: CPT | Performed by: RADIOLOGY

## 2024-02-26 ENCOUNTER — SPECIALTY PHARMACY (OUTPATIENT)
Dept: PHARMACY | Facility: CLINIC | Age: 79
End: 2024-02-26

## 2024-02-28 ENCOUNTER — SPECIALTY PHARMACY (OUTPATIENT)
Dept: PHARMACY | Facility: CLINIC | Age: 79
End: 2024-02-28

## 2024-02-28 ENCOUNTER — PHARMACY VISIT (OUTPATIENT)
Dept: PHARMACY | Facility: CLINIC | Age: 79
End: 2024-02-28
Payer: COMMERCIAL

## 2024-02-28 PROCEDURE — RXMED WILLOW AMBULATORY MEDICATION CHARGE

## 2024-03-26 PROCEDURE — RXMED WILLOW AMBULATORY MEDICATION CHARGE

## 2024-03-30 ENCOUNTER — PHARMACY VISIT (OUTPATIENT)
Dept: PHARMACY | Facility: CLINIC | Age: 79
End: 2024-03-30
Payer: COMMERCIAL

## 2024-04-01 ENCOUNTER — SPECIALTY PHARMACY (OUTPATIENT)
Dept: PHARMACY | Facility: CLINIC | Age: 79
End: 2024-04-01

## 2024-04-01 ENCOUNTER — OFFICE VISIT (OUTPATIENT)
Dept: HEMATOLOGY/ONCOLOGY | Facility: HOSPITAL | Age: 79
End: 2024-04-01
Payer: MEDICARE

## 2024-04-01 ENCOUNTER — SPECIALTY PHARMACY (OUTPATIENT)
Dept: HEMATOLOGY/ONCOLOGY | Facility: HOSPITAL | Age: 79
End: 2024-04-01

## 2024-04-01 ENCOUNTER — LAB (OUTPATIENT)
Dept: LAB | Facility: HOSPITAL | Age: 79
End: 2024-04-01
Payer: MEDICARE

## 2024-04-01 VITALS
DIASTOLIC BLOOD PRESSURE: 77 MMHG | WEIGHT: 228.7 LBS | BODY MASS INDEX: 34.15 KG/M2 | RESPIRATION RATE: 16 BRPM | SYSTOLIC BLOOD PRESSURE: 130 MMHG | OXYGEN SATURATION: 98 % | HEART RATE: 74 BPM | TEMPERATURE: 97.9 F

## 2024-04-01 DIAGNOSIS — C34.90 MALIGNANT NEOPLASM OF LUNG, UNSPECIFIED LATERALITY, UNSPECIFIED PART OF LUNG (MULTI): ICD-10-CM

## 2024-04-01 DIAGNOSIS — C34.90 LUNG CANCER (MULTI): ICD-10-CM

## 2024-04-01 DIAGNOSIS — C34.90 MALIGNANT NEOPLASM OF LUNG, UNSPECIFIED LATERALITY, UNSPECIFIED PART OF LUNG (MULTI): Primary | ICD-10-CM

## 2024-04-01 LAB
ALBUMIN SERPL BCP-MCNC: 3.7 G/DL (ref 3.4–5)
ALP SERPL-CCNC: 93 U/L (ref 33–136)
ALT SERPL W P-5'-P-CCNC: 18 U/L (ref 7–45)
ANION GAP SERPL CALC-SCNC: 13 MMOL/L (ref 10–20)
AST SERPL W P-5'-P-CCNC: 19 U/L (ref 9–39)
BASOPHILS # BLD AUTO: 0.03 X10*3/UL (ref 0–0.1)
BASOPHILS NFR BLD AUTO: 0.5 %
BILIRUB SERPL-MCNC: 0.8 MG/DL (ref 0–1.2)
BUN SERPL-MCNC: 15 MG/DL (ref 6–23)
CALCIUM SERPL-MCNC: 9 MG/DL (ref 8.6–10.3)
CHLORIDE SERPL-SCNC: 104 MMOL/L (ref 98–107)
CO2 SERPL-SCNC: 28 MMOL/L (ref 21–32)
CREAT SERPL-MCNC: 0.98 MG/DL (ref 0.5–1.05)
EGFRCR SERPLBLD CKD-EPI 2021: 60 ML/MIN/1.73M*2
EOSINOPHIL # BLD AUTO: 0.05 X10*3/UL (ref 0–0.4)
EOSINOPHIL NFR BLD AUTO: 0.8 %
ERYTHROCYTE [DISTWIDTH] IN BLOOD BY AUTOMATED COUNT: 14.4 % (ref 11.5–14.5)
GLUCOSE SERPL-MCNC: 91 MG/DL (ref 74–99)
HCT VFR BLD AUTO: 40.4 % (ref 36–46)
HGB BLD-MCNC: 13.5 G/DL (ref 12–16)
IMM GRANULOCYTES # BLD AUTO: 0.01 X10*3/UL (ref 0–0.5)
IMM GRANULOCYTES NFR BLD AUTO: 0.2 % (ref 0–0.9)
LYMPHOCYTES # BLD AUTO: 1.32 X10*3/UL (ref 0.8–3)
LYMPHOCYTES NFR BLD AUTO: 21 %
MCH RBC QN AUTO: 31.8 PG (ref 26–34)
MCHC RBC AUTO-ENTMCNC: 33.4 G/DL (ref 32–36)
MCV RBC AUTO: 95 FL (ref 80–100)
MONOCYTES # BLD AUTO: 0.77 X10*3/UL (ref 0.05–0.8)
MONOCYTES NFR BLD AUTO: 12.3 %
NEUTROPHILS # BLD AUTO: 4.1 X10*3/UL (ref 1.6–5.5)
NEUTROPHILS NFR BLD AUTO: 65.2 %
NRBC BLD-RTO: 0 /100 WBCS (ref 0–0)
PLATELET # BLD AUTO: 153 X10*3/UL (ref 150–450)
POTASSIUM SERPL-SCNC: 3.7 MMOL/L (ref 3.5–5.3)
PROT SERPL-MCNC: 6 G/DL (ref 6.4–8.2)
RBC # BLD AUTO: 4.24 X10*6/UL (ref 4–5.2)
SODIUM SERPL-SCNC: 141 MMOL/L (ref 136–145)
WBC # BLD AUTO: 6.3 X10*3/UL (ref 4.4–11.3)

## 2024-04-01 PROCEDURE — 1159F MED LIST DOCD IN RCRD: CPT | Performed by: CLINICAL NURSE SPECIALIST

## 2024-04-01 PROCEDURE — 36415 COLL VENOUS BLD VENIPUNCTURE: CPT

## 2024-04-01 PROCEDURE — 1126F AMNT PAIN NOTED NONE PRSNT: CPT | Performed by: CLINICAL NURSE SPECIALIST

## 2024-04-01 PROCEDURE — 99214 OFFICE O/P EST MOD 30 MIN: CPT | Performed by: CLINICAL NURSE SPECIALIST

## 2024-04-01 PROCEDURE — 80053 COMPREHEN METABOLIC PANEL: CPT

## 2024-04-01 PROCEDURE — 3075F SYST BP GE 130 - 139MM HG: CPT | Performed by: CLINICAL NURSE SPECIALIST

## 2024-04-01 PROCEDURE — 3078F DIAST BP <80 MM HG: CPT | Performed by: CLINICAL NURSE SPECIALIST

## 2024-04-01 PROCEDURE — 85025 COMPLETE CBC W/AUTO DIFF WBC: CPT

## 2024-04-01 ASSESSMENT — ENCOUNTER SYMPTOMS
NAUSEA: 0
NECK PAIN: 1
CHANGE IN BOWEL HABIT: 0
WEAKNESS: 0
VERTIGO: 0
ABDOMINAL PAIN: 0
SWOLLEN GLANDS: 0
SORE THROAT: 0
COUGH: 0
FEVER: 0
JOINT SWELLING: 0
VOMITING: 0
CHILLS: 0
FATIGUE: 0
MYALGIAS: 0
ARTHRALGIAS: 0
ANOREXIA: 0
HEADACHES: 0
DIAPHORESIS: 0
NUMBNESS: 0
VISUAL CHANGE: 0

## 2024-04-01 ASSESSMENT — PAIN SCALES - GENERAL: PAINLEVEL: 0-NO PAIN

## 2024-04-01 NOTE — PROGRESS NOTES
Patient ID: Tracey Chiu is a 79 y.o. female.    DIAGNOSIS     Adenocarcinoma of the lung.  Date of diagnosis is March 3 of 2022 where a posterior pleural biopsy demonstrated adenocarcinoma.  Immunohistochemistry positive for CK7 and TTF-1 1.        STAGING     T4 (Mediastinal fat invasion  seen by direct observation during thoracoscopy) N0, M1 a (left pleural involvement)        CURRENT SITES OF DISEASE     ELIZABETH  with mediastinal fat invasion, left pleura        MOLECULAR GENOMICS     PD-L1 immunohistochemistry tumor proportional score 20%  Next generation sequencing using [facility] solid tumor panel  EGFR [id number] mutation positive        SERUM TUMOR MARKER     Normal CEA and CA 19.9 in March 2022        PRIOR THERAPY     1-   Left pleural talc pleurodesis dated March 3, 2022        CURRENT THERAPY     Single agent Tagrisso, started April 14, 2022. Documented response with resolution of left pleural effusion and reduction of left upper lobe lesion in June 2022        CURRENT ONCOLOGICAL PROBLEMS     1-  peripancreatic inflammation/stranding seen on imaging suggestive of pancreatitis, possibly alcohol related.This was seen at time of diagnosis.  Continue to follow pancreatic imaging consider referral for pancreatic evaluation.  MRI of the pancreas  done on July 19, 2022.  Refer to pancreatic surgery for evaluation.  Now followed by Dr. Calderon  2- hospitalized 11/22 for COPD exacerbation        HISTORY OF PRESENT ILLNESS     This is a 77-year-old patient.  She had a fall in October 2021 where she found herself falling down many stairs.  There was apparent loss of consciousness according to the daughter. She was admitted to the hospital and underwent a posterior cervical fusion  for fracture on October 28, 2021.  Reportedly at that time a lung lesion was found and imaging studies including a CT scan was done demonstrating a lesion abutting the left upper lobe.  Upon recovery from her neck surgery further  work-up was performed.  A combined PET/CT dated February a 2022 showed hypermetabolic activity in the left upper lobe mass abutting the mediastinum with no mediastinal lymphadenopathy.  There was some fat stranding and soft tissue fullness along the pancreatic head and proximal  body with mild hypermetabolic activity suggestive of pancreatitis.  There was intense hypermetabolic activity identified within the ethmoid sinuses and to a lesser extent the left maxillary antrum consistent with sinusitis. He had a CT scan of the chest  on March 1 of 2022 showing a left upper lobe mass abutting the left side of the upper mediastinum measuring 3.6 cm in greatest dimension.  There was a small to moderate left pleural effusion which had increased in size compared to the PET/CT of February 8 of 2022 and it was new compared to the CT scan of the chest of October 28 of 2021.  There was scattered bilateral pulmonary nodules however most of these were only a few millimeters and nonsignificant and unchanged compared to October.  There was also  a 2.8 cm hypodense area in the pancreatic head and uncinate process.  There was some mild stranding of the peripancreatic fat which was thought to be related to inflammation around the pancreas.  There was a compression fracture of L1 vertebral body that  was stable. She was admitted to the hospital from March 3 to March 5, 2022 for a video-assisted thorascopic pleural biopsy and talc pleurodesis.Operative report from March 3, 2022 states that about 100 cc of serosanguineous fluid was removed.  There were  small subtle pleural nodules within the posterior inferior part of the pleural that was seen along with diffuse thickening of the pleura anterolaterally which was biopsied.  The tumor seemed to invade the mediastinal fat by inspection.  Talc was inserted  after frozen sections demonstrated malignancy.        PAST MEDICAL HISTORY     1-  nasal polyps s/p surgery in November 2021  2-   hypertension  3-  follow-up in October 2021 resulting in cervical vertebral body fracture s/p cervical posterior fusion on October 28, 2021  4-  pancreatitis?  Seen on PET and CT imaging of February and March 2022.  Pancreatic inflammation is stranding in the setting of possible alcohol abuse.  5-   L4 compression vertebral body fracture seen on imaging studies since February 2022        SOCIAL HISTORY     Patient generally lives in [place] on her own.  She is currently 8 being with her significant other/friend in [facility].  She has 1 daughter Didi who is an  in [place].  He started smoking at the age of 18.  She stopped in 1994.   She smoked for 25 years 1 pack a day.  She is retired.  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  Patient and daughter report that she has several glasses of wine at night.        CURRENT MEDS     See medication list, meds reviewed        ALLERGIES     patient is allergic to aspirin and has nasal polyps associated with this        FAMILY HISTORY     Patient's father had prostate cancer at the age of 70 patient's mother had ovarian cancer at the age of 86 and patient's brother also had prostate cancer at age 68       Subjective    Today I am meeting with Tracey Chiu.  She reports that she is doing well on the Tagrisso- now 2 years since starting.  She does have occasional diarrhea, usually after her first meal of the day- this happens about twice a week.  Her nails are brittle and frequently break, hands worse than feet.  No other side effects.  She and her partner Antione are going to [place] in June.      Cancer  Associated symptoms include neck pain. Pertinent negatives include no abdominal pain, anorexia, arthralgias, change in bowel habit, chest pain, chills, congestion, coughing, diaphoresis, fatigue, fever, headaches, joint swelling, myalgias, nausea, numbness, rash, sore throat, swollen glands,  urinary symptoms, vertigo, visual change, vomiting or weakness.       Objective    BSA: 2.24 meters squared  /77 (BP Location: Left arm, Patient Position: Sitting)   Pulse 74   Temp 36.6 °C (97.9 °F) (Temporal)   Resp 16   Wt 104 kg (228 lb 11.2 oz)   LMP  (LMP Unknown)   SpO2 98%   BMI 34.15 kg/m²      Physical Exam  Constitutional:       Appearance: Normal appearance. She is normal weight.   HENT:      Mouth/Throat:      Mouth: Mucous membranes are moist.      Pharynx: Oropharynx is clear. No oropharyngeal exudate or posterior oropharyngeal erythema.   Eyes:      General: No scleral icterus.     Conjunctiva/sclera: Conjunctivae normal.   Cardiovascular:      Rate and Rhythm: Normal rate and regular rhythm.      Pulses: Normal pulses.      Heart sounds: Normal heart sounds. No murmur heard.     No friction rub. No gallop.   Pulmonary:      Effort: Pulmonary effort is normal. No respiratory distress.      Breath sounds: Normal breath sounds. No stridor. No wheezing, rhonchi or rales.   Abdominal:      General: Abdomen is flat. Bowel sounds are normal. There is no distension.      Palpations: Abdomen is soft. There is no mass.      Tenderness: There is no abdominal tenderness. There is no guarding or rebound.   Musculoskeletal:      Right lower leg: No edema.      Left lower leg: No edema.   Lymphadenopathy:      Cervical: No cervical adenopathy.   Skin:     General: Skin is warm.      Coloration: Skin is not jaundiced or pale.      Findings: No bruising or erythema.   Neurological:      General: No focal deficit present.      Mental Status: She is oriented to person, place, and time.   Psychiatric:         Mood and Affect: Mood normal.         Behavior: Behavior normal.       Performance Status:  Asymptomatic       CT Chest 12/28/2023  IMPRESSION:  1. Mild interval increase in size in the left upper lobe  paramediastinal mass with stable bilateral solid noncalcified  pulmonary nodules as described  above. No new suspicious pulmonary  nodules. No thoracic lymphadenopathy.  2. Stable multi-cystic septated lesion in the pancreatic head  compared to MRI pancreas dated 07/05/2020.  3. Partially visualized cholelithiasis without evidence of  cholecystitis.  4. Additional stable findings as described above.      Assessment/Plan     This is a very nice 78-year-old patient with EGFR mutation positive, exon 21 adenocarcinoma of the lung, stage Boom disease with left pleural involvement who had an excellent response to single agent Tagrisso.  She has been on this agent since April 2022.  Last  CT scan that showed  no evidence of disease progression.  She has some mild loose stools on occasion and brittle nails.  Labs are excellent- no evidence of toxicity.  We will see her back in 3 months time with blood work, and repeat scan.  She is in agreement with the plan.       Cancer Staging   No matching staging information was found for the patient.    Oncology History   Lung cancer (CMS/HCC)   8/7/2023 Initial Diagnosis    Lung cancer (CMS/HCC)     11/29/2023 -  Chemotherapy    Osimertinib, 28 Day Cycles                   JONATHON Allison-CNS

## 2024-04-09 ENCOUNTER — OFFICE VISIT (OUTPATIENT)
Dept: OTOLARYNGOLOGY | Facility: CLINIC | Age: 79
End: 2024-04-09
Payer: MEDICARE

## 2024-04-09 VITALS — BODY MASS INDEX: 33.33 KG/M2 | WEIGHT: 225 LBS | HEIGHT: 69 IN

## 2024-04-09 DIAGNOSIS — J32.0 CHRONIC MAXILLARY SINUSITIS: Primary | ICD-10-CM

## 2024-04-09 DIAGNOSIS — R43.8 DECREASED SENSE OF SMELL: ICD-10-CM

## 2024-04-09 DIAGNOSIS — J32.2 CHRONIC ETHMOIDAL SINUSITIS: ICD-10-CM

## 2024-04-09 DIAGNOSIS — J33.9 NASAL POLYP: ICD-10-CM

## 2024-04-09 PROCEDURE — 1159F MED LIST DOCD IN RCRD: CPT | Performed by: OTOLARYNGOLOGY

## 2024-04-09 PROCEDURE — 31231 NASAL ENDOSCOPY DX: CPT | Performed by: OTOLARYNGOLOGY

## 2024-04-09 PROCEDURE — 99213 OFFICE O/P EST LOW 20 MIN: CPT | Performed by: OTOLARYNGOLOGY

## 2024-04-09 PROCEDURE — 1160F RVW MEDS BY RX/DR IN RCRD: CPT | Performed by: OTOLARYNGOLOGY

## 2024-04-09 RX ORDER — DOXYCYCLINE 100 MG/1
100 CAPSULE ORAL 2 TIMES DAILY
Qty: 20 CAPSULE | Refills: 0 | Status: SHIPPED | OUTPATIENT
Start: 2024-04-09 | End: 2024-04-19

## 2024-04-09 NOTE — PROGRESS NOTES
Chief Complaint:  1. Chronic sinusitis with nasal polyposis; aspirin sensitivity  2. Decreased sense of smell  3. Allergic rhinitis  4. Lung adenocarcinoma    History Of Present Illness:    Tracey Chiu presents since last being seen October 9, 2023.  She has been on Nucala for less than 6 months.  She has an evaluation with Dr. Andrews scheduled in the near future.  She has baseline sinonasal symptoms as outlined above.  She mentioned not needing oral steroid therapy in a prolonged period of time and she attributes this to the biologic.  She does have Xhance but mentioned that it will soon become cost prohibitive and she is not planning to refill it.    Main Symptoms:  Patient does not have anterior nasal drainage.     Patient has  posterior nasal drainage.    Patient does not have nasal airway obstruction.   Patient does not have  facial pain.    Patient does not have  facial pressure.    Patient has decreased sense of smell. Decreased 0 % of normal.   Associated Symptoms:   Patient does not have  headaches.    Patient does not have throat clearing.    Patient does not have coughing.    Patient does not have  dysphonia.   Patient does not have sneezing.   Patient does not have itchy eyes.   Patient has nasal bleeding.     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    Surgical History:  She has a past surgical history that includes Nose surgery (07/20/2013); Tonsillectomy (07/20/2013); Other surgical history (11/28/2021); Other surgical history (03/10/2022); and Mouth surgery (01/26/2015).     Family History:  Family History   Problem Relation Name Age of Onset    Ovarian cancer Mother      Prostate cancer Father      Prostate cancer Brother         Social History:  She reports that she has quit smoking. Her smoking use included cigarettes. She has been exposed to tobacco smoke. She has never used smokeless tobacco. She reports current alcohol use of about 14.0 standard drinks of alcohol per week. She reports that she does not use drugs.     Allergies:  Aspirin    Current Meds:    Current Outpatient Medications:     acetaminophen (Tylenol) 325 mg tablet, Take by mouth every 6 hours if needed., Disp: , Rfl:     albuterol (Ventolin HFA) 90 mcg/actuation inhaler, Inhale., Disp: , Rfl:     cholecalciferol (Vitamin D-3) 25 MCG (1000 UT) tablet, Take 1 tablet (25 mcg) by mouth once daily., Disp: , Rfl:     krill oil 500 mg capsule, Take 1 capsule (500 mg) by mouth once daily., Disp: , Rfl:     mepolizumab (NUCALA SUBQ), Inject under the skin., Disp: , Rfl:     metoprolol succinate XL (Toprol-XL) 50 mg 24 hr tablet, TAKE 1 TABLET DAILY, Disp: 90 tablet, Rfl: 1    montelukast (Singulair) 10 mg tablet, TAKE 1 TABLET DAILY AS DIRECTED, Disp: 90 tablet, Rfl: 3    multivit/folic acid/vit K1 (ONE-A-DAY WOMEN'S 50 PLUS ORAL), Take 1 tablet by mouth once daily., Disp: , Rfl:     osimertinib (Tagrisso) 80 mg tablet, Take 1 tablet (80 mg total) by mouth once daily.  Swallow whole., Disp: 30 tablet, Rfl: 3    osimertinib (Tagrisso) 80 mg tablet, Take  1 tablet (80 mg total) by mouth once daily.  Swallow whole., Disp: 30 tablet, Rfl: 3    Trelegy Ellipta 200-62.5-25 mcg blister with device, Inhale., Disp: , Rfl:     Xhance 93 mcg/actuation aerosol breath activated, Administer into affected nostril(s)., Disp: , Rfl:     Vitals:  Visit Vitals  LMP  (LMP Unknown)   OB Status Postmenopausal   Smoking Status Former      Physical Exam:  Nose: On external exam there are neither lesions nor asymmetry of the nasal tip/dorsum. On anterior rhinoscopy, visualization posteriorly is limited on anterior examination. For this reason, to adequately evaluate posteriorly for masses, source of epistaxis, polypoid disease, debridement, and/or signs of infections, nasal endoscopy is indicated.  (Please see procedure below.)    SINONASAL ENDOSCOPY (CPT 74953): To better evaluate the patient's symptoms, sinonasal endoscopy is indicated.  After discussion of risks and benefits, and topical decongestion and anesthesia,an endoscope was used to perform nasal endoscopy on each side.  A time out identifying the patient, the procedure, the location of the procedure and any concerns was performed prior to beginning the procedure.    Findings:  Examination of the right nasal cavity revealed polypoid tissue at the middle meatus and sphenoethmoid recess that extended just below the superior aspect of the posterior choana.  Examination of the left nasal cavity revealed pus at the middle meatus.  The polypoid tissue extended past the superior aspect of the inferior turbinate to about 4 to 6 mm off the nasal cavity floor.    Provider Impressions:  1. Chronic sinusitis with nasal polyposis; aspirin sensitivity currently on Nucala  2. Decreased sense of smell, postnasal drainage, epistaxis  3. Allergic rhinitis  4. Lung adenocarcinoma    Discussion:  Tracey Chiu and I discussed next steps.  I asked her to continue the Nucala but we did discuss that she should ask her prescribing physician when she  can expect maximal benefit as I saw some minor improvement today but still significant polyp bulk bilaterally.  She could consider Dupixent or Xolair as long as she is a candidate.  We discussed some laboratory findings that can help clarify what she is a candidate for.    There was evidence of infection from the left middle meatus today and I prescribed her doxycycline.  I would not suggest prednisone at this point given her exam and she was comfortable with this.    In regard to topical therapy, she certainly could consider resuming over-the-counter topical steroid therapy with fluticasone, budesonide, or Nasacort.  She also could consider a rinse based steroid.  I think her highest yield would be continuing the biologic and consider changing to a different biologic if clinically indicated.    I asked her to follow-up with me in about 6 months.  She was amenable to this and all questions were answered.    Signature:  Scribe Attestation  By signing my name below, I, Brandy Plummer   attest that this documentation has been prepared under the direction and in the presence of Andrew Thorne MD.

## 2024-04-25 PROCEDURE — RXMED WILLOW AMBULATORY MEDICATION CHARGE

## 2024-04-29 ENCOUNTER — SPECIALTY PHARMACY (OUTPATIENT)
Dept: PHARMACY | Facility: CLINIC | Age: 79
End: 2024-04-29

## 2024-04-30 ENCOUNTER — PHARMACY VISIT (OUTPATIENT)
Dept: PHARMACY | Facility: CLINIC | Age: 79
End: 2024-04-30
Payer: COMMERCIAL

## 2024-04-30 DIAGNOSIS — J45.909 ASTHMA, UNSPECIFIED ASTHMA SEVERITY, UNSPECIFIED WHETHER COMPLICATED, UNSPECIFIED WHETHER PERSISTENT (HHS-HCC): Primary | ICD-10-CM

## 2024-05-02 RX ORDER — FLUTICASONE FUROATE, UMECLIDINIUM BROMIDE AND VILANTEROL TRIFENATATE 200; 62.5; 25 UG/1; UG/1; UG/1
POWDER RESPIRATORY (INHALATION) DAILY
Qty: 1 EACH | Refills: 3 | Status: SHIPPED | OUTPATIENT
Start: 2024-05-02

## 2024-05-16 ENCOUNTER — SPECIALTY PHARMACY (OUTPATIENT)
Dept: PHARMACY | Facility: CLINIC | Age: 79
End: 2024-05-16

## 2024-05-16 PROCEDURE — RXMED WILLOW AMBULATORY MEDICATION CHARGE

## 2024-05-17 ENCOUNTER — SPECIALTY PHARMACY (OUTPATIENT)
Dept: PHARMACY | Facility: CLINIC | Age: 79
End: 2024-05-17

## 2024-05-18 ENCOUNTER — PHARMACY VISIT (OUTPATIENT)
Dept: PHARMACY | Facility: CLINIC | Age: 79
End: 2024-05-18
Payer: COMMERCIAL

## 2024-06-17 DIAGNOSIS — C34.00 MALIGNANT NEOPLASM OF HILUS OF LUNG, UNSPECIFIED LATERALITY (MULTI): Primary | ICD-10-CM

## 2024-06-18 ENCOUNTER — APPOINTMENT (OUTPATIENT)
Dept: PRIMARY CARE | Facility: CLINIC | Age: 79
End: 2024-06-18
Payer: MEDICARE

## 2024-06-18 VITALS
WEIGHT: 222 LBS | BODY MASS INDEX: 32.78 KG/M2 | DIASTOLIC BLOOD PRESSURE: 76 MMHG | HEART RATE: 69 BPM | OXYGEN SATURATION: 99 % | SYSTOLIC BLOOD PRESSURE: 130 MMHG

## 2024-06-18 DIAGNOSIS — E66.09 CLASS 1 OBESITY DUE TO EXCESS CALORIES WITH SERIOUS COMORBIDITY AND BODY MASS INDEX (BMI) OF 32.0 TO 32.9 IN ADULT: ICD-10-CM

## 2024-06-18 DIAGNOSIS — K86.2 PANCREAS CYST (HHS-HCC): ICD-10-CM

## 2024-06-18 DIAGNOSIS — S13.170S: ICD-10-CM

## 2024-06-18 DIAGNOSIS — Z00.00 MEDICARE ANNUAL WELLNESS VISIT, SUBSEQUENT: Primary | ICD-10-CM

## 2024-06-18 DIAGNOSIS — I48.0 PAROXYSMAL ATRIAL FIBRILLATION WITH RVR (MULTI): ICD-10-CM

## 2024-06-18 DIAGNOSIS — Q45.3 PANCREATIC DUCTAL ABNORMALITY: ICD-10-CM

## 2024-06-18 DIAGNOSIS — I10 HYPERTENSION, UNSPECIFIED TYPE: ICD-10-CM

## 2024-06-18 DIAGNOSIS — J33.9 NASAL POLYPOSIS: ICD-10-CM

## 2024-06-18 DIAGNOSIS — J45.909 ASTHMA, UNSPECIFIED ASTHMA SEVERITY, UNSPECIFIED WHETHER COMPLICATED, UNSPECIFIED WHETHER PERSISTENT (HHS-HCC): ICD-10-CM

## 2024-06-18 DIAGNOSIS — E55.9 VITAMIN D DEFICIENCY: ICD-10-CM

## 2024-06-18 DIAGNOSIS — C34.90 MALIGNANT NEOPLASM OF LUNG, UNSPECIFIED LATERALITY, UNSPECIFIED PART OF LUNG (MULTI): ICD-10-CM

## 2024-06-18 PROBLEM — E66.811 CLASS 1 OBESITY DUE TO EXCESS CALORIES WITH SERIOUS COMORBIDITY AND BODY MASS INDEX (BMI) OF 32.0 TO 32.9 IN ADULT: Status: ACTIVE | Noted: 2024-06-18

## 2024-06-18 PROBLEM — J32.2 CHRONIC ETHMOIDAL SINUSITIS: Status: RESOLVED | Noted: 2023-08-07 | Resolved: 2024-06-18

## 2024-06-18 PROCEDURE — 3075F SYST BP GE 130 - 139MM HG: CPT | Performed by: PEDIATRICS

## 2024-06-18 PROCEDURE — G0439 PPPS, SUBSEQ VISIT: HCPCS | Performed by: PEDIATRICS

## 2024-06-18 PROCEDURE — 1170F FXNL STATUS ASSESSED: CPT | Performed by: PEDIATRICS

## 2024-06-18 PROCEDURE — 3078F DIAST BP <80 MM HG: CPT | Performed by: PEDIATRICS

## 2024-06-18 PROCEDURE — 1036F TOBACCO NON-USER: CPT | Performed by: PEDIATRICS

## 2024-06-18 PROCEDURE — G0009 ADMIN PNEUMOCOCCAL VACCINE: HCPCS | Performed by: PEDIATRICS

## 2024-06-18 PROCEDURE — 99213 OFFICE O/P EST LOW 20 MIN: CPT | Performed by: PEDIATRICS

## 2024-06-18 PROCEDURE — 1124F ACP DISCUSS-NO DSCNMKR DOCD: CPT | Performed by: PEDIATRICS

## 2024-06-18 PROCEDURE — 90677 PCV20 VACCINE IM: CPT | Performed by: PEDIATRICS

## 2024-06-18 ASSESSMENT — PATIENT HEALTH QUESTIONNAIRE - PHQ9
2. FEELING DOWN, DEPRESSED OR HOPELESS: NOT AT ALL
1. LITTLE INTEREST OR PLEASURE IN DOING THINGS: NOT AT ALL
SUM OF ALL RESPONSES TO PHQ9 QUESTIONS 1 AND 2: 0

## 2024-06-18 ASSESSMENT — ACTIVITIES OF DAILY LIVING (ADL)
TAKING_MEDICATION: INDEPENDENT
GROCERY_SHOPPING: INDEPENDENT
DRESSING: INDEPENDENT
DOING_HOUSEWORK: INDEPENDENT
MANAGING_FINANCES: INDEPENDENT
BATHING: INDEPENDENT

## 2024-06-18 NOTE — ASSESSMENT & PLAN NOTE
Saw Dr De La Torre 4 years ago; patient still declines anticoagulation but may agree to seeing another cardiologist

## 2024-06-18 NOTE — PROGRESS NOTES
Subjective   Reason for Visit: Tracey Chiu is an 79 y.o. female here for a Medicare Wellness visit.               HPI  CAC 0 2020  Saw Dr De La Torre 4 years ago for paroxysmal a fib; she does not want anticoagulation.   Sinuses doing well with Xhance and Nucala  Dexa normal in August 23 and  mammogram normal this year  Declines colon cancer screening at this time  Patient Care Team:  Salina Thomas MD as PCP - General  Salina Thomas MD as PCP - O Medicare Advantage PCP  Mega Angel MD as Consulting Physician (Hematology and Oncology)     Review of Systems  Getting over a cough and cold currently  Objective   Vitals:  /76   Pulse 69   Wt 101 kg (222 lb)   LMP  (LMP Unknown)   SpO2 99%   BMI 32.78 kg/m²       Physical Exam  HEENT neg  Lungs mild intermittent wheeze and congested cough  Heart RRR  Abd soft  Trace edema  Assessment/Plan   Problem List Items Addressed This Visit       Asthma ([facility])    Current Assessment & Plan     Well controlled; has not needed rescue inhaler         Hypertension    Current Assessment & Plan     Well controlled         Lung cancer (Multi)    Current Assessment & Plan     CT scan to be done this week and will see oncologist next week         Nasal polyposis    Current Assessment & Plan     Improved with Xhance         Subluxation of C6-C7 cervical vertebrae    Current Assessment & Plan     Limited ROM;          Vitamin D deficiency    Paroxysmal atrial fibrillation with RVR (Multi)    Current Assessment & Plan     Saw Dr De La Torre 4 years ago; patient still declines anticoagulation but may agree to seeing another cardiologist         Pancreas cyst ([facility])    Relevant Orders    MRCP pancreas w and wo IV contrast    Pancreatic ductal abnormality    Relevant Orders    MRCP pancreas w and wo IV contrast    Class 1 obesity due to excess calories with serious comorbidity and body mass index (BMI) of 32.0 to 32.9 in adult     Other Visit Diagnoses       Medicare annual wellness  visit, subsequent    -  Primary

## 2024-06-18 NOTE — PATIENT INSTRUCTIONS
If cough persists 10 days, I can call in an antibiotic  Mucinex DM; use your Albuterol 2 puffs every hours as needed.  Return in 6 months; avoid cheese and chips

## 2024-06-20 ENCOUNTER — LAB (OUTPATIENT)
Dept: LAB | Facility: CLINIC | Age: 79
End: 2024-06-20
Payer: MEDICARE

## 2024-06-20 ENCOUNTER — HOSPITAL ENCOUNTER (OUTPATIENT)
Dept: RADIOLOGY | Facility: CLINIC | Age: 79
Discharge: HOME | End: 2024-06-20
Payer: MEDICARE

## 2024-06-20 DIAGNOSIS — C34.90 LUNG CANCER (MULTI): ICD-10-CM

## 2024-06-20 LAB
BASOPHILS # BLD AUTO: 0.02 X10*3/UL (ref 0–0.1)
BASOPHILS NFR BLD AUTO: 0.4 %
CREAT SERPL-MCNC: 1 MG/DL (ref 0.6–1.3)
EOSINOPHIL # BLD AUTO: 0.02 X10*3/UL (ref 0–0.4)
EOSINOPHIL NFR BLD AUTO: 0.4 %
ERYTHROCYTE [DISTWIDTH] IN BLOOD BY AUTOMATED COUNT: 14 % (ref 11.5–14.5)
GFR SERPL CREATININE-BSD FRML MDRD: 58 ML/MIN/1.73M*2
HCT VFR BLD AUTO: 41.4 % (ref 36–46)
HGB BLD-MCNC: 13.6 G/DL (ref 12–16)
IMM GRANULOCYTES # BLD AUTO: 0.01 X10*3/UL (ref 0–0.5)
IMM GRANULOCYTES NFR BLD AUTO: 0.2 % (ref 0–0.9)
LYMPHOCYTES # BLD AUTO: 1.33 X10*3/UL (ref 0.8–3)
LYMPHOCYTES NFR BLD AUTO: 24.4 %
MCH RBC QN AUTO: 31.1 PG (ref 26–34)
MCHC RBC AUTO-ENTMCNC: 32.9 G/DL (ref 32–36)
MCV RBC AUTO: 95 FL (ref 80–100)
MONOCYTES # BLD AUTO: 0.63 X10*3/UL (ref 0.05–0.8)
MONOCYTES NFR BLD AUTO: 11.6 %
NEUTROPHILS # BLD AUTO: 3.43 X10*3/UL (ref 1.6–5.5)
NEUTROPHILS NFR BLD AUTO: 63 %
NRBC BLD-RTO: NORMAL /100{WBCS}
PLATELET # BLD AUTO: 225 X10*3/UL (ref 150–450)
RBC # BLD AUTO: 4.38 X10*6/UL (ref 4–5.2)
WBC # BLD AUTO: 5.4 X10*3/UL (ref 4.4–11.3)

## 2024-06-20 PROCEDURE — 2550000001 HC RX 255 CONTRASTS: Performed by: CLINICAL NURSE SPECIALIST

## 2024-06-20 PROCEDURE — 85025 COMPLETE CBC W/AUTO DIFF WBC: CPT

## 2024-06-20 PROCEDURE — 71260 CT THORAX DX C+: CPT

## 2024-06-20 PROCEDURE — 82565 ASSAY OF CREATININE: CPT | Performed by: CLINICAL NURSE SPECIALIST

## 2024-06-20 PROCEDURE — 84075 ASSAY ALKALINE PHOSPHATASE: CPT

## 2024-06-20 PROCEDURE — 36415 COLL VENOUS BLD VENIPUNCTURE: CPT

## 2024-06-21 LAB
ALBUMIN SERPL BCP-MCNC: 3.5 G/DL (ref 3.4–5)
ALP SERPL-CCNC: 81 U/L (ref 33–136)
ALT SERPL W P-5'-P-CCNC: 25 U/L (ref 7–45)
ANION GAP SERPL CALC-SCNC: 11 MMOL/L (ref 10–20)
AST SERPL W P-5'-P-CCNC: 20 U/L (ref 9–39)
BILIRUB SERPL-MCNC: 0.5 MG/DL (ref 0–1.2)
BUN SERPL-MCNC: 11 MG/DL (ref 6–23)
CALCIUM SERPL-MCNC: 9.2 MG/DL (ref 8.6–10.6)
CHLORIDE SERPL-SCNC: 107 MMOL/L (ref 98–107)
CO2 SERPL-SCNC: 28 MMOL/L (ref 21–32)
CREAT SERPL-MCNC: 0.89 MG/DL (ref 0.5–1.05)
EGFRCR SERPLBLD CKD-EPI 2021: 67 ML/MIN/1.73M*2
GLUCOSE SERPL-MCNC: 90 MG/DL (ref 74–99)
POTASSIUM SERPL-SCNC: 3.6 MMOL/L (ref 3.5–5.3)
PROT SERPL-MCNC: 6 G/DL (ref 6.4–8.2)
SODIUM SERPL-SCNC: 142 MMOL/L (ref 136–145)

## 2024-06-24 ENCOUNTER — SPECIALTY PHARMACY (OUTPATIENT)
Dept: PHARMACY | Facility: CLINIC | Age: 79
End: 2024-06-24

## 2024-06-24 ENCOUNTER — OFFICE VISIT (OUTPATIENT)
Dept: HEMATOLOGY/ONCOLOGY | Facility: HOSPITAL | Age: 79
End: 2024-06-24
Payer: MEDICARE

## 2024-06-24 VITALS
HEART RATE: 69 BPM | WEIGHT: 225.97 LBS | DIASTOLIC BLOOD PRESSURE: 67 MMHG | BODY MASS INDEX: 33.37 KG/M2 | SYSTOLIC BLOOD PRESSURE: 127 MMHG | RESPIRATION RATE: 20 BRPM | OXYGEN SATURATION: 98 % | TEMPERATURE: 97.9 F

## 2024-06-24 DIAGNOSIS — C34.90 LUNG CANCER (MULTI): ICD-10-CM

## 2024-06-24 PROCEDURE — 1159F MED LIST DOCD IN RCRD: CPT | Performed by: INTERNAL MEDICINE

## 2024-06-24 PROCEDURE — 99215 OFFICE O/P EST HI 40 MIN: CPT | Performed by: INTERNAL MEDICINE

## 2024-06-24 PROCEDURE — 1126F AMNT PAIN NOTED NONE PRSNT: CPT | Performed by: INTERNAL MEDICINE

## 2024-06-24 PROCEDURE — RXMED WILLOW AMBULATORY MEDICATION CHARGE

## 2024-06-24 PROCEDURE — 1036F TOBACCO NON-USER: CPT | Performed by: INTERNAL MEDICINE

## 2024-06-24 PROCEDURE — 3074F SYST BP LT 130 MM HG: CPT | Performed by: INTERNAL MEDICINE

## 2024-06-24 PROCEDURE — 3078F DIAST BP <80 MM HG: CPT | Performed by: INTERNAL MEDICINE

## 2024-06-24 ASSESSMENT — ENCOUNTER SYMPTOMS
HEADACHES: 0
CHANGE IN BOWEL HABIT: 0
FEVER: 0
COUGH: 1
VERTIGO: 0
NECK PAIN: 0
MYALGIAS: 0
ANOREXIA: 0
SORE THROAT: 0
DIAPHORESIS: 0
NAUSEA: 0
SWOLLEN GLANDS: 0
ABDOMINAL PAIN: 0
VOMITING: 0
VISUAL CHANGE: 0
ARTHRALGIAS: 0
NUMBNESS: 0
CHILLS: 0
FATIGUE: 0
WEAKNESS: 0
JOINT SWELLING: 0

## 2024-06-24 ASSESSMENT — PAIN SCALES - GENERAL: PAINLEVEL: 0-NO PAIN

## 2024-06-24 NOTE — PROGRESS NOTES
Patient ID: Tracey Chiu is a 79 y.o. female.    DIAGNOSIS     Adenocarcinoma of the lung.  Date of diagnosis is March 3 of 2022 where a posterior pleural biopsy demonstrated adenocarcinoma.  Immunohistochemistry positive for CK7 and TTF-1 1.        STAGING     T4 (Mediastinal fat invasion  seen by direct observation during thoracoscopy) N0, M1 a (left pleural involvement)        CURRENT SITES OF DISEASE     ELIZABETH  with mediastinal fat invasion, left pleura        MOLECULAR GENOMICS     PD-L1 immunohistochemistry tumor proportional score 20%  Next generation sequencing using [facility] solid tumor panel  EGFR [id number] mutation positive        SERUM TUMOR MARKER     Normal CEA and CA 19.9 in March 2022        PRIOR THERAPY     1-   Left pleural talc pleurodesis dated March 3, 2022        CURRENT THERAPY     Single agent Tagrisso, started April 14, 2022. Documented response with resolution of left pleural effusion and reduction of left upper lobe lesion in June 2022        CURRENT ONCOLOGICAL PROBLEMS     1-  peripancreatic inflammation/stranding seen on imaging suggestive of pancreatitis, possibly alcohol related.This was seen at time of diagnosis.  Continue to follow pancreatic imaging consider referral for pancreatic evaluation.  MRI of the pancreas  done on July 19, 2022.  Refer to pancreatic surgery for evaluation.  Now followed by Dr. Calderon  2- hospitalized 11/22 for COPD exacerbation        HISTORY OF PRESENT ILLNESS     This is a 79-year-old patient.  She had a fall in October 2021 where she found herself falling down many stairs.  There was apparent loss of consciousness according to the daughter. She was admitted to the hospital and underwent a posterior cervical fusion  for fracture on October 28, 2021.  Reportedly at that time a lung lesion was found and imaging studies including a CT scan was done demonstrating a lesion abutting the left upper lobe.  Upon recovery from her neck surgery further  work-up was performed.  A combined PET/CT dated February a 2022 showed hypermetabolic activity in the left upper lobe mass abutting the mediastinum with no mediastinal lymphadenopathy.  There was some fat stranding and soft tissue fullness along the pancreatic head and proximal  body with mild hypermetabolic activity suggestive of pancreatitis.  There was intense hypermetabolic activity identified within the ethmoid sinuses and to a lesser extent the left maxillary antrum consistent with sinusitis. He had a CT scan of the chest  on March 1 of 2022 showing a left upper lobe mass abutting the left side of the upper mediastinum measuring 3.6 cm in greatest dimension.  There was a small to moderate left pleural effusion which had increased in size compared to the PET/CT of February 8 of 2022 and it was new compared to the CT scan of the chest of October 28 of 2021.  There was scattered bilateral pulmonary nodules however most of these were only a few millimeters and nonsignificant and unchanged compared to October.  There was also  a 2.8 cm hypodense area in the pancreatic head and uncinate process.  There was some mild stranding of the peripancreatic fat which was thought to be related to inflammation around the pancreas.  There was a compression fracture of L1 vertebral body that  was stable. She was admitted to the hospital from March 3 to March 5, 2022 for a video-assisted thorascopic pleural biopsy and talc pleurodesis.Operative report from March 3, 2022 states that about 100 cc of serosanguineous fluid was removed.  There were  small subtle pleural nodules within the posterior inferior part of the pleural that was seen along with diffuse thickening of the pleura anterolaterally which was biopsied.  The tumor seemed to invade the mediastinal fat by inspection.  Talc was inserted  after frozen sections demonstrated malignancy.        PAST MEDICAL HISTORY     1-  nasal polyps s/p surgery in November 2021  2-   hypertension  3-  follow-up in October 2021 resulting in cervical vertebral body fracture s/p cervical posterior fusion on October 28, 2021  4-  pancreatitis?  Seen on PET and CT imaging of February and March 2022.  Pancreatic inflammation is stranding in the setting of possible alcohol abuse.  5-   L4 compression vertebral body fracture seen on imaging studies since February 2022        SOCIAL HISTORY     Patient  lives in [place] on her own.   She has 1 daughter Didi who is an  in [place].  He started smoking at the age of 18.  She stopped in 1994.   She smoked for 25 years 1 pack a day.  She is retired.  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  Patient and daughter report that she has several glasses of wine at night.        CURRENT MEDS     See medication list, meds reviewed        ALLERGIES     patient is allergic to aspirin and has nasal polyps associated with this        FAMILY HISTORY     Patient's father had prostate cancer at the age of 70 patient's mother had ovarian cancer at the age of 86 and patient's brother also had prostate cancer at age 68      Subjective    Cancer  Associated symptoms include congestion and coughing. Pertinent negatives include no abdominal pain, anorexia, arthralgias, change in bowel habit, chest pain, chills, diaphoresis, fatigue, fever, headaches, joint swelling, myalgias, nausea, neck pain, numbness, rash, sore throat, swollen glands, urinary symptoms, vertigo, visual change, vomiting or weakness.     Patient is clinically doing well, she states her nasal polyps have improved with her recent changes in her allergy medication she still remains congested from time to time but able to breathe through her nose has a chronic mild cough with this, her bowel movements are occasionally watery but not every day and not very severe.  She is tolerating this well.  Her nails are brittle but no  inflammation.  No other skin complaints.    Objective    BSA: 2.23 meters squared  /67 (BP Location: Left arm, Patient Position: Sitting, BP Cuff Size: Adult)   Pulse 69   Temp 36.6 °C (97.9 °F) (Temporal)   Resp 20   Wt 102 kg (225 lb 15.5 oz)   LMP  (LMP Unknown)   SpO2 98%   BMI 33.37 kg/m²      Physical Exam  Constitutional:       General: She is not in acute distress.     Appearance: Normal appearance. She is not ill-appearing, toxic-appearing or diaphoretic.   HENT:      Nose: No congestion or rhinorrhea.      Mouth/Throat:      Pharynx: No oropharyngeal exudate or posterior oropharyngeal erythema.   Eyes:      General: No scleral icterus.     Conjunctiva/sclera: Conjunctivae normal.   Cardiovascular:      Rate and Rhythm: Normal rate and regular rhythm.      Pulses: Normal pulses.      Heart sounds: Normal heart sounds. No murmur heard.     No friction rub. No gallop.   Pulmonary:      Effort: No respiratory distress.      Breath sounds: No stridor. No wheezing, rhonchi or rales.   Chest:      Chest wall: No tenderness.   Abdominal:      General: There is no distension.      Palpations: There is no mass.      Tenderness: There is no abdominal tenderness. There is no guarding or rebound.   Musculoskeletal:      Cervical back: No tenderness.      Right lower leg: No edema.      Left lower leg: No edema.   Lymphadenopathy:      Cervical: No cervical adenopathy.   Skin:     General: Skin is warm.      Coloration: Skin is not jaundiced.      Findings: No bruising or lesion.   Neurological:      General: No focal deficit present.      Mental Status: She is oriented to person, place, and time.   Psychiatric:         Mood and Affect: Mood normal.         Behavior: Behavior normal.         Performance Status:  Asymptomatic     Latest Reference Range & Units 06/20/24 14:10   GLUCOSE 74 - 99 mg/dL 90   SODIUM 136 - 145 mmol/L 142   POTASSIUM 3.5 - 5.3 mmol/L 3.6   CHLORIDE 98 - 107 mmol/L 107   Bicarbonate  21 - 32 mmol/L 28   Anion Gap 10 - 20 mmol/L 11   Blood Urea Nitrogen 6 - 23 mg/dL 11   Creatinine 0.50 - 1.05 mg/dL 0.89   EGFR >60 mL/min/1.73m*2 67   Calcium 8.6 - 10.6 mg/dL 9.2   Albumin 3.4 - 5.0 g/dL 3.5   Alkaline Phosphatase 33 - 136 U/L 81   ALT 7 - 45 U/L 25   AST 9 - 39 U/L 20   Bilirubin Total 0.0 - 1.2 mg/dL 0.5   Total Protein 6.4 - 8.2 g/dL 6.0 (L)   LEUKOCYTES (10*3/UL) IN BLOOD BY AUTOMATED COUNT, Belgian 4.4 - 11.3 x10*3/uL 5.4   nRBC  COMMENT ONLY   ERYTHROCYTES (10*6/UL) IN BLOOD BY AUTOMATED COUNT, Belgian 4.00 - 5.20 x10*6/uL 4.38   HEMOGLOBIN 12.0 - 16.0 g/dL 13.6   HEMATOCRIT 36.0 - 46.0 % 41.4   MCV 80 - 100 fL 95   MCH 26.0 - 34.0 pg 31.1   MCHC 32.0 - 36.0 g/dL 32.9   RED CELL DISTRIBUTION WIDTH 11.5 - 14.5 % 14.0   PLATELETS (10*3/UL) IN BLOOD AUTOMATED COUNT, Belgian 150 - 450 x10*3/uL 225   NEUTROPHILS/100 LEUKOCYTES IN BLOOD BY AUTOMATED COUNT, Belgian 40.0 - 80.0 % 63.0   Immature Granulocytes %, Automated 0.0 - 0.9 % 0.2   Lymphocytes % 13.0 - 44.0 % 24.4   Monocytes % 2.0 - 10.0 % 11.6   Eosinophils % 0.0 - 6.0 % 0.4   Basophils % 0.0 - 2.0 % 0.4   NEUTROPHILS (10*3/UL) IN BLOOD BY AUTOMATED COUNT, Belgian 1.60 - 5.50 x10*3/uL 3.43   Immature Granulocytes Absolute, Automated 0.00 - 0.50 x10*3/uL 0.01   Lymphocytes Absolute 0.80 - 3.00 x10*3/uL 1.33   Monocytes Absolute 0.05 - 0.80 x10*3/uL 0.63   Eosinophils Absolute 0.00 - 0.40 x10*3/uL 0.02   Basophils Absolute 0.00 - 0.10 x10*3/uL 0.02   POCT Creatinine 0.60 - 1.30 mg/dL 1.00   POCT eGFR >=60 mL/min/1.73m*2 58 (L)   (L): Data is abnormally low      CT CHEST W IV CONTRAST; 6/20/2024   IMPRESSION:  Lung cancer restaging scan. When compared to the prior examination  dated 12/28/2023, there has been no significant interval change of  the intrathoracic tumor burden without definite evidence of new or  worsening malignancy. Additional stable chronic and incidental  findings described above.      Assessment/Plan       This is a  very nice 79-year-old patient with a diagnosis of exon 21 EGFR mutation positive adenocarcinoma of the lung, stage Boom by virtue of left pleural involvement who is being treated with single agent Tagrisso since April 2022 with a good radiographic response.  She is also tolerating the treatment well except for some grade 1 intermittent diarrhea.  She is on full doses of Tagrisso 80 mg daily.  She had a recent CT scan of the chest dated June 2024 that I personally reviewed showing no evidence of disease progression.  Based on this we will see her back in 3 months time with blood work only.      Cancer Staging   No matching staging information was found for the patient.      Oncology History   Lung cancer (Multi)   8/7/2023 Initial Diagnosis    Lung cancer (CMS/HCC)     11/29/2023 -  Chemotherapy    Osimertinib, 28 Day Cycles          Mega Angel MD  Professor of Medicine and Oncology  [facility]  Michelle Alcantara Chair in Lung Cancer  Myra Moreland Endowed Director  [facility]  Thoracic Oncology  [facility]

## 2024-06-25 ENCOUNTER — SPECIALTY PHARMACY (OUTPATIENT)
Dept: PHARMACY | Facility: CLINIC | Age: 79
End: 2024-06-25

## 2024-06-27 ENCOUNTER — PHARMACY VISIT (OUTPATIENT)
Dept: PHARMACY | Facility: CLINIC | Age: 79
End: 2024-06-27
Payer: COMMERCIAL

## 2024-07-13 DIAGNOSIS — I10 HYPERTENSION, UNSPECIFIED TYPE: ICD-10-CM

## 2024-07-16 RX ORDER — METOPROLOL SUCCINATE 50 MG/1
50 TABLET, EXTENDED RELEASE ORAL DAILY
Qty: 90 TABLET | Refills: 1 | Status: SHIPPED | OUTPATIENT
Start: 2024-07-16

## 2024-07-20 PROCEDURE — RXMED WILLOW AMBULATORY MEDICATION CHARGE

## 2024-07-22 ENCOUNTER — SPECIALTY PHARMACY (OUTPATIENT)
Dept: PHARMACY | Facility: CLINIC | Age: 79
End: 2024-07-22

## 2024-07-22 ENCOUNTER — OFFICE VISIT (OUTPATIENT)
Dept: PULMONOLOGY | Facility: CLINIC | Age: 79
End: 2024-07-22
Payer: MEDICARE

## 2024-07-22 VITALS
SYSTOLIC BLOOD PRESSURE: 125 MMHG | HEART RATE: 75 BPM | RESPIRATION RATE: 16 BRPM | OXYGEN SATURATION: 95 % | DIASTOLIC BLOOD PRESSURE: 72 MMHG | BODY MASS INDEX: 33.67 KG/M2 | WEIGHT: 228 LBS

## 2024-07-22 DIAGNOSIS — J45.909 ASTHMA, UNSPECIFIED ASTHMA SEVERITY, UNSPECIFIED WHETHER COMPLICATED, UNSPECIFIED WHETHER PERSISTENT (HHS-HCC): ICD-10-CM

## 2024-07-22 PROCEDURE — 99213 OFFICE O/P EST LOW 20 MIN: CPT | Performed by: INTERNAL MEDICINE

## 2024-07-22 PROCEDURE — 3078F DIAST BP <80 MM HG: CPT | Performed by: INTERNAL MEDICINE

## 2024-07-22 PROCEDURE — 1126F AMNT PAIN NOTED NONE PRSNT: CPT | Performed by: INTERNAL MEDICINE

## 2024-07-22 PROCEDURE — 1036F TOBACCO NON-USER: CPT | Performed by: INTERNAL MEDICINE

## 2024-07-22 PROCEDURE — 1159F MED LIST DOCD IN RCRD: CPT | Performed by: INTERNAL MEDICINE

## 2024-07-22 PROCEDURE — 3074F SYST BP LT 130 MM HG: CPT | Performed by: INTERNAL MEDICINE

## 2024-07-22 RX ORDER — FLUTICASONE FUROATE, UMECLIDINIUM BROMIDE AND VILANTEROL TRIFENATATE 200; 62.5; 25 UG/1; UG/1; UG/1
1 POWDER RESPIRATORY (INHALATION) DAILY
Qty: 3 EACH | Refills: 3 | Status: SHIPPED | OUTPATIENT
Start: 2024-07-22

## 2024-07-22 ASSESSMENT — COLUMBIA-SUICIDE SEVERITY RATING SCALE - C-SSRS
6. HAVE YOU EVER DONE ANYTHING, STARTED TO DO ANYTHING, OR PREPARED TO DO ANYTHING TO END YOUR LIFE?: NO
2. HAVE YOU ACTUALLY HAD ANY THOUGHTS OF KILLING YOURSELF?: NO
1. IN THE PAST MONTH, HAVE YOU WISHED YOU WERE DEAD OR WISHED YOU COULD GO TO SLEEP AND NOT WAKE UP?: NO

## 2024-07-22 ASSESSMENT — PAIN SCALES - GENERAL: PAINLEVEL: 0-NO PAIN

## 2024-07-22 ASSESSMENT — ENCOUNTER SYMPTOMS: DEPRESSION: 0

## 2024-07-22 NOTE — PROGRESS NOTES
Department of Medicine  Division of Pulmonary, Critical Care, and Sleep Medicine  Follow-Up Visit  [facility] - [address], [address]    Physician HPI (7/22/2024):  Pleasant 79-year-old female with history of asthma/COPD, nasal polyps, stage IV lung adenocarcinoma presenting for follow-up.    Doing well since last visit, no exacerbations, rarely using albuterol.    Immunization History   Administered Date(s) Administered    Flu vaccine, quadrivalent, high-dose, preservative free, age 65y+ (FLUZONE) 11/09/2020, 10/03/2022    Influenza, High Dose Seasonal, Preservative Free 10/27/2017, 11/08/2018    Influenza, Seasonal, Quadrivalent, Adjuvanted 10/19/2021    Influenza, trivalent, adjuvanted 11/01/2019    Moderna COVID-19 vaccine, Fall 2023, 12 yeasrs and older (50mcg/0.5mL) 01/18/2024    Pfizer COVID-19 vaccine, bivalent, age 12 years and older (30 mcg/0.3 mL) 10/03/2022    Pfizer Gray Cap SARS-CoV-2 04/12/2022    Pfizer Purple Cap SARS-CoV-2 02/09/2021, 03/02/2021, 09/28/2021    Pneumococcal conjugate vaccine, 13-valent (PREVNAR 13) 01/16/2015    Pneumococcal conjugate vaccine, 20-valent (PREVNAR 20) 06/18/2024    Pneumococcal polysaccharide vaccine, 23-valent, age 2 years and older (PNEUMOVAX 23) 09/08/2012, 04/26/2022    RSV, 60 Years And Older (AREXVY) 11/24/2023    Tdap vaccine, age 7 year and older (BOOSTRIX, ADACEL) 09/08/2012, 01/01/2018    Zoster vaccine, recombinant, adult (SHINGRIX) 04/25/2018, 01/22/2020, 06/05/2023, 09/01/2023       Current Medications:  Current Outpatient Medications   Medication Instructions    acetaminophen (Tylenol) 325 mg tablet oral, Every 6 hours PRN    albuterol (Ventolin HFA) 90 mcg/actuation inhaler inhalation    cholecalciferol (Vitamin D-3) 25 MCG (1000 UT) tablet 1 tablet, oral, Daily    fluticasone-umeclidin-vilanter (Trelegy Ellipta) 200-62.5-25 mcg blister with device inhalation, Daily    krill oil 500 mg capsule 1 capsule, oral, Daily    mepolizumab  "(NUCALA SUBQ) subcutaneous    metoprolol succinate XL (TOPROL-XL) 50 mg, oral, Daily    montelukast (Singulair) 10 mg tablet TAKE 1 TABLET DAILY AS DIRECTED    multivit/folic acid/vit K1 (ONE-A-DAY WOMEN'S 50 PLUS ORAL) 1 tablet, oral, Daily    Tagrisso 80 mg, oral, Daily, Swallow whole.    Xhance 93 mcg/actuation aerosol breath activated nasal        Drug Allergies/Intolerances:  Allergies   Allergen Reactions    Aspirin Other     anxiety    maybe allergic, had panic and allergy is questionable??    Tree And Shrub Pollen Runny nose          Visit Vitals  /72   Pulse 75   Resp 16   Wt 103 kg (228 lb)   LMP  (LMP Unknown)   SpO2 95%   BMI 33.67 kg/m²   OB Status Postmenopausal   Smoking Status Former   BSA 2.24 m²        Physical exam  Constitutional: Normal appearance.  HEENT: Normocephalic and atraumatic.  Cardiovascular: Normal rate and regular rhythm.  Pulmonary: Normal respiratory effort, bilateral clear breath sounds, no wheezing or rhonchi.  Musculoskeletal: No edema, no cyanosis.  Neurological: Awake, alert and oriented x3.  Psychiatric: Normal behavior, mood and affect.      Pulmonary Function Test Results     Pulmonary Functions Testing Results:    No results found for: \"FEV1\", \"FVC\", \"CJJ9LYY\", \"TLC\", \"DLCO\"      Chest Radiograph     XR chest 1 view 11/08/2022    Narrative  MRN: [id number]  Patient Name: ERIKA GOMEZ    STUDY:  CHEST 1 VIEW;  11/8/2022 7:46 am    INDICATION:  pneumonia .    COMPARISON:  11/05/2022; CT chest dated 11/05/2022    ACCESSION NUMBER(S):  [id number]    ORDERING CLINICIAN:  YUKI PRATT    TECHNIQUE:    FINDINGS:  Heart is normal in size.    There is poor definition of the left hemidiaphragm. There is no  discrete consolidation.    There appears to be tortuosity of the aorta.    There are degenerative changes spine.    COMPARISON OF FINDING:  The chest is similar.    Impression  The spiculated area in the left upper lobe visible on CT is not well  seen. This may be due to a " combination of rotation and overlying  wires.    Question of left basilar atelectasis or scarring.      Echocardiogram     Echocardiogram     Narrative  [facility] at [facility], [address] [address]  Tel [phone] and Fax [phone]    TRANSTHORACIC ECHOCARDIOGRAM REPORT      Patient Name:     ERIKA GOMEZ Reading Physician:   [id number] Margo Long MD  Study Date:       12/12/2022     Referring Physician: MALGORZATA CHAND  MRN/PID:          [id number]       PCP:  Accession/Order#: [id number]      Department Location: [facility] Echo Lab  YOB: 1945      Fellow:  Gender:           F              Nurse:  Admit Date:                      Sonographer:         Mishel Boudreaux RDCS  Admission Status: Outpatient     Additional Staff:  Height:           175.26 cm      CC Report to:  Weight:           99.79 kg       Study Type:          Echocardiogram  BSA:              2.15 m2  Blood Pressure: 128 /80 mmHg    Diagnosis/ICD: R06.02-Shortness of breath  Indication:    SOB  Procedure/CPT: Echo Complete w Full [id number]    Patient History:  Pertinent History: A-Fib and HTN. Adenocarcinoma.    Study Detail: The following Echo studies were performed: 2D, M-Mode, Doppler and  color flow. Technically challenging study due to body habitus.      PHYSICIAN INTERPRETATION:  Left Ventricle: The left ventricular systolic function is normal, with an estimated ejection fraction of 60-65%. There are no regional wall motion abnormalities. The left ventricular cavity size is normal. Left ventricular diastolic filling was indeterminate.  Left Atrium: The left atrium is mildly dilated.  Right Ventricle: The right ventricle is normal in size. There is normal right ventricular global systolic function.  Right Atrium: The right atrium is normal in size.  Aortic Valve: The aortic valve is trileaflet. There is no evidence of aortic valve regurgitation. The peak instantaneous gradient of the  aortic valve is 7.4 mmHg.  Mitral Valve: The mitral valve is normal in structure. There is trace to mild mitral valve regurgitation.  Tricuspid Valve: The tricuspid valve is structurally normal. There is trace tricuspid regurgitation. The Doppler estimated RVSP is within normal limits at 28.1 mmHg.  Pulmonic Valve: The pulmonic valve is not well visualized. There is physiologic pulmonic valve regurgitation.  Pericardium: There is a trivial pericardial effusion. There is an anterior clear space.  Aorta: The aortic root is normal.  Systemic Veins: The inferior vena cava appears to be of normal size. There is IVC inspiratory collapse greater than 50%.  In comparison to the previous echocardiogram(s): Compared with the prior exam from 9/24/2018 ( [facility]) there are no signficant changes.      CONCLUSIONS:  1. Left ventricular systolic function is normal with a 60-65% estimated ejection fraction.  2. RVSP within normal limits.  3. Compared with the prior exam from 9/24/2018 ( [facility]) there are no signficant changes.    QUANTITATIVE DATA SUMMARY:  2D MEASUREMENTS:  Normal Ranges:  Ao Root d:     3.20 cm   (2.0-3.7cm)  LAs:           3.15 cm   (2.7-4.0cm)  RVIDd:         2.04 cm   (0.9-3.6cm)  IVSd:          0.86 cm   (0.6-1.1cm)  LVPWd:         0.89 cm   (0.6-1.1cm)  LVIDd:         4.74 cm   (3.9-5.9cm)  LVIDs:         3.34 cm  LV Mass Index: 64.4 g/m2  LV % FS        29.5 %    LA VOLUME:  Normal Ranges:  LA Vol A4C:       77.2 ml    (22+/-6mL/m2)  LA Vol A2C:       85.0 ml  LA Vol BP:        81.7 ml  LA Vol Index A4C: 35.9 ml/m2  LA Vol Index A2C: 39.5 ml/m2  LA Vol Index BP:  38.0 ml/m2  LA Volume Index:  38.0 ml/m2  LA Vol A4C:       71.4 ml  LA Vol A2C:       82.3 ml    RA VOLUME BY A/L METHOD:  Normal Ranges:  RA Area A4C: 14.3 cm2    AORTA MEASUREMENTS:  Normal Ranges:  Ao Sinus, d: 3.30 cm (2.1-3.5cm)  Asc Ao, d:   3.30 cm (2.1-3.4cm)    LV SYSTOLIC FUNCTION BY 2D PLANIMETRY (MOD):  Normal  Ranges:  EF-A4C View: 68.8 % (>=55%)  EF-A2C View: 53.4 %  EF-Biplane:  62.0 %    LV DIASTOLIC FUNCTION:  Normal Ranges:  MV Peak E:        0.98 m/s    (0.7-1.2 m/s)  MV Peak A:        1.00 m/s    (0.42-0.7 m/s)  E/A Ratio:        0.98        (1.0-2.2)  MV e'             0.08 m/s    (>8.0)  MV A Dur:         125.59 msec  E/e' Ratio:       12.25       (<8.0)  a'                0.10 m/s  PulmV Sys Jacques:    56.50 cm/s  PulmV Hong Jacques:   45.41 cm/s  PulmV S/D Jacques:    1.24  PulmV A Revs Jacques: 33.37 cm/s  PulmV A Revs Dur: 151.28 msec    MITRAL VALVE:  Normal Ranges:  MV DT: 194 msec (150-240msec)    AORTIC VALVE:  Normal Ranges:  AoV Vmax:      1.36 m/s (<=1.7m/s)  AoV Peak P.4 mmHg (<20mmHg)  LVOT Max Jacques:  1.31 m/s (<=1.1m/s)  LVOT VTI:      29.73 cm  LVOT Diameter: 2.03 cm  (1.8-2.4cm)  AoV Area,Vmax: 3.14 cm2 (2.5-4.5cm2)    RIGHT VENTRICLE:  RV 1   3.5 cm  RV 2   2.1 cm  RV 3   8.0 cm  TAPSE: 34.0 mm  RV s'  0.15 m/s    TRICUSPID VALVE/RVSP:  Normal Ranges:  Peak TR Velocity: 2.51 m/s  RV Syst Pressure: 28.1 mmHg (< 30mmHg)  IVC Diam:         1.60 cm    PULMONIC VALVE:  Normal Ranges:  PV Accel Time: 103 msec (>120ms)  PV Max Jacques:    0.9 m/s  (0.6-0.9m/s)  PV Max PG:     3.1 mmHg    Pulmonary Veins:  PulmV A Revs Dur: 151.28 msec  PulmV A Revs Jacques: 33.37 cm/s  PulmV Hong Jacques:   45.41 cm/s  PulmV S/D Jacques:    1.24  PulmV Sys Jacques:    56.50 cm/s    AORTA:  Asc Ao Diam 3.33 cm      [id number] Margo Long MD  Electronically signed on 2022 at 6:10:32 PM         Final        Chest CT Scan     No results found for this or any previous visit from the past 365 days.       Laboratory Studies     Lab Results   Component Value Date    WBC 5.4 2024    HGB 13.6 2024    HCT 41.4 2024    MCV 95 2024     2024      Lab Results   Component Value Date    GLUCOSE 90 2024    CALCIUM 9.2 2024     2024    K 3.6 2024    CO2 28 2024     2024    BUN 11  "06/20/2024    CREATININE 0.89 06/20/2024      Lab Results   Component Value Date    ALT 25 06/20/2024    AST 20 06/20/2024    ALKPHOS 81 06/20/2024    BILITOT 0.5 06/20/2024        Sputum Culture     No results found for: \"AFBCX\"   No results found for: \"RESPCULTCYFI\"  No results found for the last 90 days.          Assessment and Plan / Recommendations     Pleasant 79-year-old female with history of asthma/COPD, stage IV lung adenocarcinoma presenting for follow-up.     1. Asthma/COPD reasonably controlled.    -Continue Trelegy, albuterol as needed -rarely needing it  -Continue montelukast  -Continue Nucala, following with allergy and ENT.       2. Lung adenocarcinoma on Tagrisso, following with oncology.  CT from June with stable findings compared to CT from December 2023.  Scheduled for follow-up CT in December.       Return to clinic in 1 year or sooner with any concerns.     This dictation was created using voice recognition software. Phonetic and/or minor grammatical errors may exist.           Agustina Marie MD   07/22/2024    "

## 2024-07-24 ENCOUNTER — PHARMACY VISIT (OUTPATIENT)
Dept: PHARMACY | Facility: CLINIC | Age: 79
End: 2024-07-24
Payer: COMMERCIAL

## 2024-08-16 ENCOUNTER — SPECIALTY PHARMACY (OUTPATIENT)
Dept: PHARMACY | Facility: CLINIC | Age: 79
End: 2024-08-16

## 2024-08-19 PROCEDURE — RXMED WILLOW AMBULATORY MEDICATION CHARGE

## 2024-08-22 ENCOUNTER — SPECIALTY PHARMACY (OUTPATIENT)
Dept: PHARMACY | Facility: CLINIC | Age: 79
End: 2024-08-22

## 2024-08-23 ENCOUNTER — PHARMACY VISIT (OUTPATIENT)
Dept: PHARMACY | Facility: CLINIC | Age: 79
End: 2024-08-23
Payer: COMMERCIAL

## 2024-09-18 ENCOUNTER — SPECIALTY PHARMACY (OUTPATIENT)
Dept: PHARMACY | Facility: CLINIC | Age: 79
End: 2024-09-18

## 2024-09-18 PROCEDURE — RXMED WILLOW AMBULATORY MEDICATION CHARGE

## 2024-09-19 ENCOUNTER — PHARMACY VISIT (OUTPATIENT)
Dept: PHARMACY | Facility: CLINIC | Age: 79
End: 2024-09-19
Payer: COMMERCIAL

## 2024-09-23 ENCOUNTER — APPOINTMENT (OUTPATIENT)
Dept: HEMATOLOGY/ONCOLOGY | Facility: HOSPITAL | Age: 79
End: 2024-09-23
Payer: MEDICARE

## 2024-09-23 ENCOUNTER — OFFICE VISIT (OUTPATIENT)
Dept: HEMATOLOGY/ONCOLOGY | Facility: HOSPITAL | Age: 79
End: 2024-09-23
Payer: MEDICARE

## 2024-09-23 ENCOUNTER — LAB (OUTPATIENT)
Dept: LAB | Facility: HOSPITAL | Age: 79
End: 2024-09-23
Payer: MEDICARE

## 2024-09-23 VITALS
WEIGHT: 231.04 LBS | BODY MASS INDEX: 34.12 KG/M2 | HEART RATE: 84 BPM | DIASTOLIC BLOOD PRESSURE: 75 MMHG | OXYGEN SATURATION: 95 % | SYSTOLIC BLOOD PRESSURE: 137 MMHG | TEMPERATURE: 96.8 F | RESPIRATION RATE: 18 BRPM

## 2024-09-23 DIAGNOSIS — C34.90 LUNG CANCER (MULTI): ICD-10-CM

## 2024-09-23 DIAGNOSIS — J45.40 MODERATE PERSISTENT ASTHMA WITHOUT COMPLICATION (HHS-HCC): ICD-10-CM

## 2024-09-23 LAB
ALBUMIN SERPL BCP-MCNC: 4 G/DL (ref 3.4–5)
ALP SERPL-CCNC: 97 U/L (ref 33–136)
ALT SERPL W P-5'-P-CCNC: 18 U/L (ref 7–45)
ANION GAP SERPL CALC-SCNC: 12 MMOL/L (ref 10–20)
AST SERPL W P-5'-P-CCNC: 20 U/L (ref 9–39)
BASOPHILS # BLD AUTO: 0.02 X10*3/UL (ref 0–0.1)
BASOPHILS NFR BLD AUTO: 0.4 %
BILIRUB SERPL-MCNC: 0.7 MG/DL (ref 0–1.2)
BUN SERPL-MCNC: 15 MG/DL (ref 6–23)
CALCIUM SERPL-MCNC: 9.6 MG/DL (ref 8.6–10.3)
CHLORIDE SERPL-SCNC: 106 MMOL/L (ref 98–107)
CO2 SERPL-SCNC: 27 MMOL/L (ref 21–32)
CREAT SERPL-MCNC: 1.02 MG/DL (ref 0.5–1.05)
EGFRCR SERPLBLD CKD-EPI 2021: 57 ML/MIN/1.73M*2
EOSINOPHIL # BLD AUTO: 0.07 X10*3/UL (ref 0–0.4)
EOSINOPHIL NFR BLD AUTO: 1.3 %
ERYTHROCYTE [DISTWIDTH] IN BLOOD BY AUTOMATED COUNT: 13.2 % (ref 11.5–14.5)
GLUCOSE SERPL-MCNC: 103 MG/DL (ref 74–99)
HCT VFR BLD AUTO: 43.2 % (ref 36–46)
HGB BLD-MCNC: 14.4 G/DL (ref 12–16)
IMM GRANULOCYTES # BLD AUTO: 0.01 X10*3/UL (ref 0–0.5)
IMM GRANULOCYTES NFR BLD AUTO: 0.2 % (ref 0–0.9)
LYMPHOCYTES # BLD AUTO: 1.31 X10*3/UL (ref 0.8–3)
LYMPHOCYTES NFR BLD AUTO: 23.8 %
MCH RBC QN AUTO: 32.4 PG (ref 26–34)
MCHC RBC AUTO-ENTMCNC: 33.3 G/DL (ref 32–36)
MCV RBC AUTO: 97 FL (ref 80–100)
MONOCYTES # BLD AUTO: 0.57 X10*3/UL (ref 0.05–0.8)
MONOCYTES NFR BLD AUTO: 10.4 %
NEUTROPHILS # BLD AUTO: 3.52 X10*3/UL (ref 1.6–5.5)
NEUTROPHILS NFR BLD AUTO: 63.9 %
NRBC BLD-RTO: 0 /100 WBCS (ref 0–0)
PLATELET # BLD AUTO: 162 X10*3/UL (ref 150–450)
POTASSIUM SERPL-SCNC: 4.4 MMOL/L (ref 3.5–5.3)
PROT SERPL-MCNC: 6.8 G/DL (ref 6.4–8.2)
RBC # BLD AUTO: 4.44 X10*6/UL (ref 4–5.2)
SODIUM SERPL-SCNC: 141 MMOL/L (ref 136–145)
WBC # BLD AUTO: 5.5 X10*3/UL (ref 4.4–11.3)

## 2024-09-23 PROCEDURE — 99214 OFFICE O/P EST MOD 30 MIN: CPT | Performed by: CLINICAL NURSE SPECIALIST

## 2024-09-23 PROCEDURE — 84460 ALANINE AMINO (ALT) (SGPT): CPT

## 2024-09-23 PROCEDURE — 36415 COLL VENOUS BLD VENIPUNCTURE: CPT

## 2024-09-23 PROCEDURE — 3078F DIAST BP <80 MM HG: CPT | Performed by: CLINICAL NURSE SPECIALIST

## 2024-09-23 PROCEDURE — 85025 COMPLETE CBC W/AUTO DIFF WBC: CPT

## 2024-09-23 PROCEDURE — 3075F SYST BP GE 130 - 139MM HG: CPT | Performed by: CLINICAL NURSE SPECIALIST

## 2024-09-23 PROCEDURE — 1036F TOBACCO NON-USER: CPT | Performed by: CLINICAL NURSE SPECIALIST

## 2024-09-23 PROCEDURE — 1126F AMNT PAIN NOTED NONE PRSNT: CPT | Performed by: CLINICAL NURSE SPECIALIST

## 2024-09-23 RX ORDER — MONTELUKAST SODIUM 10 MG/1
10 TABLET ORAL NIGHTLY
Qty: 30 TABLET | Refills: 0 | Status: SHIPPED | OUTPATIENT
Start: 2024-09-23 | End: 2025-09-23

## 2024-09-23 ASSESSMENT — ENCOUNTER SYMPTOMS
CHANGE IN BOWEL HABIT: 0
VISUAL CHANGE: 0
ANOREXIA: 0
VOMITING: 0
SORE THROAT: 0
ABDOMINAL PAIN: 0
NECK PAIN: 0
COUGH: 1
JOINT SWELLING: 0
NUMBNESS: 0
HEADACHES: 0
NAUSEA: 0
DIAPHORESIS: 0
WEAKNESS: 0
FATIGUE: 0
VERTIGO: 0
SWOLLEN GLANDS: 0
FEVER: 0
MYALGIAS: 0
CHILLS: 0
ARTHRALGIAS: 0

## 2024-09-23 ASSESSMENT — PAIN SCALES - GENERAL: PAINLEVEL: 0-NO PAIN

## 2024-09-23 NOTE — PROGRESS NOTES
Patient ID: Tracey Chiu is a 79 y.o. female.    DIAGNOSIS     Adenocarcinoma of the lung.  Date of diagnosis is March 3 of 2022 where a posterior pleural biopsy demonstrated adenocarcinoma.  Immunohistochemistry positive for CK7 and TTF-1 1.        STAGING     T4 (Mediastinal fat invasion  seen by direct observation during thoracoscopy) N0, M1 a (left pleural involvement)        CURRENT SITES OF DISEASE     ELIZABETH  with mediastinal fat invasion, left pleura        MOLECULAR GENOMICS     PD-L1 immunohistochemistry tumor proportional score 20%  Next generation sequencing using [facility] solid tumor panel  EGFR [id number] mutation positive        SERUM TUMOR MARKER     Normal CEA and CA 19.9 in March 2022        PRIOR THERAPY     1-   Left pleural talc pleurodesis dated March 3, 2022        CURRENT THERAPY     Single agent Tagrisso, started April 14, 2022. Documented response with resolution of left pleural effusion and reduction of left upper lobe lesion in June 2022        CURRENT ONCOLOGICAL PROBLEMS     1-  peripancreatic inflammation/stranding seen on imaging suggestive of pancreatitis, possibly alcohol related.This was seen at time of diagnosis.  Continue to follow pancreatic imaging consider referral for pancreatic evaluation.  MRI of the pancreas  done on July 19, 2022.  Refer to pancreatic surgery for evaluation.  Now followed by Dr. Calderon  2- hospitalized 11/22 for COPD exacerbation        HISTORY OF PRESENT ILLNESS     This is a 79-year-old patient.  She had a fall in October 2021 where she found herself falling down many stairs.  There was apparent loss of consciousness according to the daughter. She was admitted to the hospital and underwent a posterior cervical fusion  for fracture on October 28, 2021.  Reportedly at that time a lung lesion was found and imaging studies including a CT scan was done demonstrating a lesion abutting the left upper lobe.  Upon recovery from her neck surgery further  work-up was performed.  A combined PET/CT dated February a 2022 showed hypermetabolic activity in the left upper lobe mass abutting the mediastinum with no mediastinal lymphadenopathy.  There was some fat stranding and soft tissue fullness along the pancreatic head and proximal  body with mild hypermetabolic activity suggestive of pancreatitis.  There was intense hypermetabolic activity identified within the ethmoid sinuses and to a lesser extent the left maxillary antrum consistent with sinusitis. He had a CT scan of the chest  on March 1 of 2022 showing a left upper lobe mass abutting the left side of the upper mediastinum measuring 3.6 cm in greatest dimension.  There was a small to moderate left pleural effusion which had increased in size compared to the PET/CT of February 8 of 2022 and it was new compared to the CT scan of the chest of October 28 of 2021.  There was scattered bilateral pulmonary nodules however most of these were only a few millimeters and nonsignificant and unchanged compared to October.  There was also  a 2.8 cm hypodense area in the pancreatic head and uncinate process.  There was some mild stranding of the peripancreatic fat which was thought to be related to inflammation around the pancreas.  There was a compression fracture of L1 vertebral body that  was stable. She was admitted to the hospital from March 3 to March 5, 2022 for a video-assisted thorascopic pleural biopsy and talc pleurodesis.Operative report from March 3, 2022 states that about 100 cc of serosanguineous fluid was removed.  There were  small subtle pleural nodules within the posterior inferior part of the pleural that was seen along with diffuse thickening of the pleura anterolaterally which was biopsied.  The tumor seemed to invade the mediastinal fat by inspection.  Talc was inserted  after frozen sections demonstrated malignancy.        PAST MEDICAL HISTORY     1-  nasal polyps s/p surgery in November 2021  2-   hypertension  3-  follow-up in October 2021 resulting in cervical vertebral body fracture s/p cervical posterior fusion on October 28, 2021  4-  pancreatitis?  Seen on PET and CT imaging of February and March 2022.  Pancreatic inflammation is stranding in the setting of possible alcohol abuse.  5-   L4 compression vertebral body fracture seen on imaging studies since February 2022        SOCIAL HISTORY     Patient  lives in [place] on her own.   She has 1 daughter Didi who is an  in [place].  He started smoking at the age of 18.  She stopped in 1994.   She smoked for 25 years 1 pack a day.  She is retired.  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  Patient and daughter report that she has several glasses of wine at night.        CURRENT MEDS     See medication list, meds reviewed        ALLERGIES     patient is allergic to aspirin and has nasal polyps associated with this        FAMILY HISTORY     Patient's father had prostate cancer at the age of 70 patient's mother had ovarian cancer at the age of 86 and patient's brother also had prostate cancer at age 68      Subjective    Today I am meeting with Tracey and her significant other, Antione.  She reports continued good tolerance of her Tagrisso, with only an occasional bout of loose stool and nail chipping as side effects.  Safety labs today are excellent.  She is on a new MAB for her nasal polyps, and feels it is working.      Cancer  Associated symptoms include congestion and coughing. Pertinent negatives include no abdominal pain, anorexia, arthralgias, change in bowel habit, chest pain, chills, diaphoresis, fatigue, fever, headaches, joint swelling, myalgias, nausea, neck pain, numbness, rash, sore throat, swollen glands, urinary symptoms, vertigo, visual change, vomiting or weakness.     Objective    BSA: 2.26 meters squared  /75 (BP Location: Right arm, Patient Position:  Sitting, BP Cuff Size: Adult)   Pulse 84   Temp 36 °C (96.8 °F) (Temporal)   Resp 18   Wt 105 kg (231 lb 0.7 oz)   LMP  (LMP Unknown)   SpO2 95%   BMI 34.12 kg/m²      Physical Exam  Constitutional:       General: She is not in acute distress.     Appearance: Normal appearance. She is not ill-appearing, toxic-appearing or diaphoretic.   HENT:      Nose: No congestion or rhinorrhea.      Mouth/Throat:      Pharynx: No oropharyngeal exudate or posterior oropharyngeal erythema.   Eyes:      General: No scleral icterus.     Conjunctiva/sclera: Conjunctivae normal.   Cardiovascular:      Rate and Rhythm: Normal rate and regular rhythm.      Pulses: Normal pulses.      Heart sounds: Normal heart sounds. No murmur heard.     No friction rub. No gallop.   Pulmonary:      Effort: No respiratory distress.      Breath sounds: No stridor. No wheezing, rhonchi or rales.   Chest:      Chest wall: No tenderness.   Abdominal:      General: There is no distension.      Palpations: There is no mass.      Tenderness: There is no abdominal tenderness. There is no guarding or rebound.   Musculoskeletal:      Cervical back: No tenderness.      Right lower leg: No edema.      Left lower leg: No edema.   Lymphadenopathy:      Cervical: No cervical adenopathy.   Skin:     General: Skin is warm.      Coloration: Skin is not jaundiced.      Findings: No bruising or lesion.   Neurological:      General: No focal deficit present.      Mental Status: She is oriented to person, place, and time.   Psychiatric:         Mood and Affect: Mood normal.         Behavior: Behavior normal.       Performance Status:  Asymptomatic     Latest Reference Range & Units 06/20/24 14:10   GLUCOSE 74 - 99 mg/dL 90   SODIUM 136 - 145 mmol/L 142   POTASSIUM 3.5 - 5.3 mmol/L 3.6   CHLORIDE 98 - 107 mmol/L 107   Bicarbonate 21 - 32 mmol/L 28   Anion Gap 10 - 20 mmol/L 11   Blood Urea Nitrogen 6 - 23 mg/dL 11   Creatinine 0.50 - 1.05 mg/dL 0.89   EGFR >60  mL/min/1.73m*2 67   Calcium 8.6 - 10.6 mg/dL 9.2   Albumin 3.4 - 5.0 g/dL 3.5   Alkaline Phosphatase 33 - 136 U/L 81   ALT 7 - 45 U/L 25   AST 9 - 39 U/L 20   Bilirubin Total 0.0 - 1.2 mg/dL 0.5   Total Protein 6.4 - 8.2 g/dL 6.0 (L)   LEUKOCYTES (10*3/UL) IN BLOOD BY AUTOMATED COUNT, [place] 4.4 - 11.3 x10*3/uL 5.4   nRBC  COMMENT ONLY   ERYTHROCYTES (10*6/UL) IN BLOOD BY AUTOMATED COUNT, [place] 4.00 - 5.20 x10*6/uL 4.38   HEMOGLOBIN 12.0 - 16.0 g/dL 13.6   HEMATOCRIT 36.0 - 46.0 % 41.4   MCV 80 - 100 fL 95   MCH 26.0 - 34.0 pg 31.1   MCHC 32.0 - 36.0 g/dL 32.9   RED CELL DISTRIBUTION WIDTH 11.5 - 14.5 % 14.0   PLATELETS (10*3/UL) IN BLOOD AUTOMATED COUNT, [place] 150 - 450 x10*3/uL 225   NEUTROPHILS/100 LEUKOCYTES IN BLOOD BY AUTOMATED COUNT, [place] 40.0 - 80.0 % 63.0   Immature Granulocytes %, Automated 0.0 - 0.9 % 0.2   Lymphocytes % 13.0 - 44.0 % 24.4   Monocytes % 2.0 - 10.0 % 11.6   Eosinophils % 0.0 - 6.0 % 0.4   Basophils % 0.0 - 2.0 % 0.4   NEUTROPHILS (10*3/UL) IN BLOOD BY AUTOMATED COUNT, [place] 1.60 - 5.50 x10*3/uL 3.43   Immature Granulocytes Absolute, Automated 0.00 - 0.50 x10*3/uL 0.01   Lymphocytes Absolute 0.80 - 3.00 x10*3/uL 1.33   Monocytes Absolute 0.05 - 0.80 x10*3/uL 0.63   Eosinophils Absolute 0.00 - 0.40 x10*3/uL 0.02   Basophils Absolute 0.00 - 0.10 x10*3/uL 0.02   POCT Creatinine 0.60 - 1.30 mg/dL 1.00   POCT eGFR >=60 mL/min/1.73m*2 58 (L)   (L): Data is abnormally low      CT CHEST W IV CONTRAST; 6/20/2024   IMPRESSION:  Lung cancer restaging scan. When compared to the prior examination  dated 12/28/2023, there has been no significant interval change of  the intrathoracic tumor burden without definite evidence of new or  worsening malignancy. Additional stable chronic and incidental  findings described above.      Assessment/Plan       This is a very nice 79-year-old patient with a diagnosis of exon 21 EGFR mutation positive adenocarcinoma of the lung, stage Boom by virtue of  left pleural involvement who is being treated with single agent Tagrisso since April 2022 with a good radiographic response.  She is also tolerating the treatment well except for some grade 1 intermittent diarrhea.  She is on full doses of Tagrisso 80 mg daily.  She had a  CT scan of the chest dated June 2024 that I personally reviewed showing no evidence of disease progression.  Today's labs are excellent.  Based on this we will see her back in 3 months time with blood work and a scan.  She is in agreement with the plan and will continue Tagrisso at full dose.         Cancer Staging   No matching staging information was found for the patient.      Oncology History   Lung cancer (Multi)   8/7/2023 Initial Diagnosis    Lung cancer (CMS/HCC)     11/29/2023 -  Chemotherapy    Osimertinib, 28 Day Cycles          JONATHON Nichols-CNS

## 2024-10-08 DIAGNOSIS — J45.40 MODERATE PERSISTENT ASTHMA WITHOUT COMPLICATION (HHS-HCC): ICD-10-CM

## 2024-10-10 RX ORDER — MONTELUKAST SODIUM 10 MG/1
TABLET ORAL
Qty: 90 TABLET | Refills: 3 | Status: SHIPPED | OUTPATIENT
Start: 2024-10-10

## 2024-10-11 NOTE — PROGRESS NOTES
S[facility]    Chief Complaint:  1. Chronic sinusitis with nasal polyposis; aspirin sensitivity currently on Nucala  2. Decreased sense of smell, postnasal drainage, epistaxis  3. Allergic rhinitis  4. Lung adenocarcinoma    History Of Present Illness:    Tracey Chiu presents since last being seen April 9, 2024.    She continues on Nucala and generally has been doing well.  She has some baseline symptoms as outlined below most notably nasal drainage and very poor sense of smell.  She has not been on any intranasal medical therapy since June 2024.    Main Symptoms:  Patient has anterior nasal drainage. Clear, bilateral  Patient has posterior nasal drainage - Occasionally  Patient does not have nasal airway obstruction.  Patient does not have facial pain.  Patient does not have facial pressure.  Patient has decreased sense of smell. Decreased 0% of normal.     Associated Symptoms:  Patient does not have headaches.  Patient does not have throat clearing.  Patient does not have coughing.  Patient has dysphonia - Hoarseness in the morning. Resolves throughout the day.   Patient has sneezing - In the morning.  Patient does not have itchy eyes.  Patient does not have nasal bleeding.    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    Lung cancer (Multi)    Nasal polyposis    Subluxation of C6-C7 cervical vertebrae    Vitamin D deficiency    Cervical spine fracture    Paroxysmal atrial fibrillation with RVR (Multi)    Pancreas cyst (HHS-HCC)    Pancreatic ductal abnormality    Seborrheic keratosis    Class 1 obesity due to excess calories with serious comorbidity and body mass index (BMI) of 32.0 to 32.9 in adult     Past Medical History:  She has a past medical history of  Asymptomatic menopausal state (04/25/2018), Personal history of other diseases of the respiratory system, and Personal history of other diseases of the respiratory system (01/22/2016).    Surgical History:  She has a past surgical history that includes Nose surgery (07/20/2013); Tonsillectomy (07/20/2013); Other surgical history (11/28/2021); Other surgical history (03/10/2022); and Mouth surgery (01/26/2015).     Family History:  Family History   Problem Relation Name Age of Onset    Ovarian cancer Mother      Prostate cancer Father      Prostate cancer Brother       Social History:  She reports that she has quit smoking. Her smoking use included cigarettes. She has been exposed to tobacco smoke. She has never used smokeless tobacco. She reports current alcohol use of about 14.0 standard drinks of alcohol per week. She reports that she does not use drugs.     Allergies:  Aspirin and Tree and shrub pollen    Current Meds:  Current Outpatient Medications:     acetaminophen (Tylenol) 325 mg tablet, Take by mouth every 6 hours if needed., Disp: , Rfl:     albuterol (Ventolin HFA) 90 mcg/actuation inhaler, Inhale., Disp: , Rfl:     cholecalciferol (Vitamin D-3) 25 MCG (1000 UT) tablet, Take 1 tablet (25 mcg) by mouth once daily., Disp: , Rfl:     fluticasone-umeclidin-vilanter (Trelegy Ellipta) 200-62.5-25 mcg blister with device, Inhale 1 puff once daily., Disp: 3 each, Rfl: 3    krill oil 500 mg capsule, Take 1 capsule (500 mg) by mouth once daily., Disp: , Rfl:     mepolizumab (NUCALA SUBQ), Inject under the skin., Disp: , Rfl:     metoprolol succinate XL (Toprol-XL) 50 mg 24 hr tablet, TAKE 1 TABLET DAILY, Disp: 90 tablet, Rfl: 1    montelukast (Singulair) 10 mg tablet, TAKE 1 TABLET DAILY AS DIRECTED, Disp: 90 tablet, Rfl: 3    multivit/folic acid/vit K1 (ONE-A-DAY WOMEN'S 50 PLUS ORAL), Take 1 tablet by mouth once daily., Disp: , Rfl:     osimertinib (Tagrisso) 80 mg tablet, Take 1 tablet (80 mg total) by mouth  "once daily.  Swallow whole., Disp: 30 tablet, Rfl: 3    Xhance 93 mcg/actuation aerosol breath activated, Administer into affected nostril(s)., Disp: , Rfl:     Vitals:  Visit Vitals  Temp 36.4 °C (97.6 °F)   Ht 1.753 m (5' 9\")   Wt 105 kg (231 lb)   LMP  (LMP Unknown)   BMI 34.11 kg/m²   OB Status Postmenopausal   Smoking Status Former   BSA 2.26 m²     Physical Exam:  Nose: On external exam there are neither lesions nor asymmetry of the nasal tip/dorsum. On anterior rhinoscopy, visualization posteriorly is limited on anterior examination. For this reason, to adequately evaluate posteriorly for masses, source of epistaxis, polypoid disease, debridement, and/or signs of infections, nasal endoscopy is indicated.  (Please see procedure below.)    SINONASAL ENDOSCOPY (CPT 82704): To better evaluate the patient's symptoms, sinonasal endoscopy is indicated.  After discussion of risks and benefits, and topical decongestion and anesthesia, an endoscope was used to perform nasal endoscopy on each side.  A time out identifying the patient, the procedure, the location of the procedure and any concerns was performed prior to beginning the procedure.    Findings:  Examination of each nasal cavity demonstrated polypoid tissue at both the middle meatus and sphenoethmoid recess to just below the posterior choana.  Anteriorly here polyps were just above the superior aspect of the inferior turbinate.  No pus bilaterally.    Provider Impressions:  1.  Chronic sinusitis with nasal polyposis; aspirin sensitivity currently on Nucala  2.  Decreased sense of smell  3.  Allergic rhinitis  4.  Lung adenocarcinoma  5.  Rhinorrhea  6.  Dysphonia    Discussion:  Tracey Chiu appeared well on examination today.  She continues with evidence of bilateral polyps but they are less prominent than on previous exams and I recommended continuation of Nucala.  I asked her to follow-up with me in 12 months but if her symptoms worsen between now and " then I am happy to see her at any time.  All questions were answered.    Scribe Attestation  By signing my name below, I, Brandy Can, attest that this documentation has been prepared under the direction and in the presence of Andrew Thorne MD.    Signature:  Andrew Thorne MD

## 2024-10-14 ENCOUNTER — APPOINTMENT (OUTPATIENT)
Dept: OTOLARYNGOLOGY | Facility: CLINIC | Age: 79
End: 2024-10-14
Payer: MEDICARE

## 2024-10-14 VITALS — BODY MASS INDEX: 34.21 KG/M2 | WEIGHT: 231 LBS | HEIGHT: 69 IN | TEMPERATURE: 97.6 F

## 2024-10-14 DIAGNOSIS — R43.8 DECREASED SENSE OF SMELL: ICD-10-CM

## 2024-10-14 DIAGNOSIS — J34.89 RHINORRHEA: ICD-10-CM

## 2024-10-14 DIAGNOSIS — J33.9 NASAL POLYP: Primary | ICD-10-CM

## 2024-10-14 DIAGNOSIS — J32.2 CHRONIC ETHMOIDAL SINUSITIS: ICD-10-CM

## 2024-10-14 PROCEDURE — 99213 OFFICE O/P EST LOW 20 MIN: CPT | Performed by: OTOLARYNGOLOGY

## 2024-10-14 PROCEDURE — 1160F RVW MEDS BY RX/DR IN RCRD: CPT | Performed by: OTOLARYNGOLOGY

## 2024-10-14 PROCEDURE — 1159F MED LIST DOCD IN RCRD: CPT | Performed by: OTOLARYNGOLOGY

## 2024-10-14 PROCEDURE — 31231 NASAL ENDOSCOPY DX: CPT | Performed by: OTOLARYNGOLOGY

## 2024-10-14 ASSESSMENT — PATIENT HEALTH QUESTIONNAIRE - PHQ9
2. FEELING DOWN, DEPRESSED OR HOPELESS: NOT AT ALL
SUM OF ALL RESPONSES TO PHQ9 QUESTIONS 1 AND 2: 0
1. LITTLE INTEREST OR PLEASURE IN DOING THINGS: NOT AT ALL

## 2024-10-15 PROCEDURE — RXMED WILLOW AMBULATORY MEDICATION CHARGE

## 2024-10-17 ENCOUNTER — SPECIALTY PHARMACY (OUTPATIENT)
Dept: PHARMACY | Facility: CLINIC | Age: 79
End: 2024-10-17

## 2024-10-22 ENCOUNTER — PHARMACY VISIT (OUTPATIENT)
Dept: PHARMACY | Facility: CLINIC | Age: 79
End: 2024-10-22
Payer: COMMERCIAL

## 2024-11-13 PROCEDURE — RXMED WILLOW AMBULATORY MEDICATION CHARGE

## 2024-11-15 ENCOUNTER — SPECIALTY PHARMACY (OUTPATIENT)
Dept: PHARMACY | Facility: CLINIC | Age: 79
End: 2024-11-15

## 2024-11-19 ENCOUNTER — PHARMACY VISIT (OUTPATIENT)
Dept: PHARMACY | Facility: CLINIC | Age: 79
End: 2024-11-19
Payer: COMMERCIAL

## 2024-12-13 ENCOUNTER — SPECIALTY PHARMACY (OUTPATIENT)
Dept: PHARMACY | Facility: CLINIC | Age: 79
End: 2024-12-13

## 2024-12-16 ENCOUNTER — LAB (OUTPATIENT)
Dept: LAB | Facility: CLINIC | Age: 79
End: 2024-12-16
Payer: MEDICARE

## 2024-12-16 DIAGNOSIS — C34.90 LUNG CANCER (MULTI): ICD-10-CM

## 2024-12-16 LAB
ALBUMIN SERPL BCP-MCNC: 3.7 G/DL (ref 3.4–5)
ALP SERPL-CCNC: 124 U/L (ref 33–136)
ALT SERPL W P-5'-P-CCNC: 25 U/L (ref 7–45)
ANION GAP SERPL CALC-SCNC: 12 MMOL/L (ref 10–20)
AST SERPL W P-5'-P-CCNC: 20 U/L (ref 9–39)
BASOPHILS # BLD AUTO: 0.02 X10*3/UL (ref 0–0.1)
BASOPHILS NFR BLD AUTO: 0.4 %
BILIRUB SERPL-MCNC: 0.7 MG/DL (ref 0–1.2)
BUN SERPL-MCNC: 18 MG/DL (ref 6–23)
CALCIUM SERPL-MCNC: 9.1 MG/DL (ref 8.6–10.6)
CHLORIDE SERPL-SCNC: 106 MMOL/L (ref 98–107)
CO2 SERPL-SCNC: 28 MMOL/L (ref 21–32)
CREAT SERPL-MCNC: 1.07 MG/DL (ref 0.5–1.05)
EGFRCR SERPLBLD CKD-EPI 2021: 54 ML/MIN/1.73M*2
EOSINOPHIL # BLD AUTO: 0.04 X10*3/UL (ref 0–0.4)
EOSINOPHIL NFR BLD AUTO: 0.7 %
ERYTHROCYTE [DISTWIDTH] IN BLOOD BY AUTOMATED COUNT: 13.5 % (ref 11.5–14.5)
GLUCOSE SERPL-MCNC: 100 MG/DL (ref 74–99)
HCT VFR BLD AUTO: 41.7 % (ref 36–46)
HGB BLD-MCNC: 13.6 G/DL (ref 12–16)
IMM GRANULOCYTES # BLD AUTO: 0 X10*3/UL (ref 0–0.5)
IMM GRANULOCYTES NFR BLD AUTO: 0 % (ref 0–0.9)
LYMPHOCYTES # BLD AUTO: 1.54 X10*3/UL (ref 0.8–3)
LYMPHOCYTES NFR BLD AUTO: 28.7 %
MCH RBC QN AUTO: 32.1 PG (ref 26–34)
MCHC RBC AUTO-ENTMCNC: 32.6 G/DL (ref 32–36)
MCV RBC AUTO: 98 FL (ref 80–100)
MONOCYTES # BLD AUTO: 0.64 X10*3/UL (ref 0.05–0.8)
MONOCYTES NFR BLD AUTO: 11.9 %
NEUTROPHILS # BLD AUTO: 3.13 X10*3/UL (ref 1.6–5.5)
NEUTROPHILS NFR BLD AUTO: 58.3 %
NRBC BLD-RTO: NORMAL /100{WBCS}
PLATELET # BLD AUTO: 164 X10*3/UL (ref 150–450)
POTASSIUM SERPL-SCNC: 4.3 MMOL/L (ref 3.5–5.3)
PROT SERPL-MCNC: 6.3 G/DL (ref 6.4–8.2)
RBC # BLD AUTO: 4.24 X10*6/UL (ref 4–5.2)
SODIUM SERPL-SCNC: 142 MMOL/L (ref 136–145)
WBC # BLD AUTO: 5.4 X10*3/UL (ref 4.4–11.3)

## 2024-12-16 PROCEDURE — 84075 ASSAY ALKALINE PHOSPHATASE: CPT

## 2024-12-16 PROCEDURE — 36415 COLL VENOUS BLD VENIPUNCTURE: CPT

## 2024-12-16 PROCEDURE — 85025 COMPLETE CBC W/AUTO DIFF WBC: CPT

## 2024-12-16 PROCEDURE — RXMED WILLOW AMBULATORY MEDICATION CHARGE

## 2024-12-17 ENCOUNTER — APPOINTMENT (OUTPATIENT)
Dept: PRIMARY CARE | Facility: CLINIC | Age: 79
End: 2024-12-17
Payer: MEDICARE

## 2024-12-18 ENCOUNTER — PHARMACY VISIT (OUTPATIENT)
Dept: PHARMACY | Facility: CLINIC | Age: 79
End: 2024-12-18
Payer: COMMERCIAL

## 2024-12-19 ENCOUNTER — HOSPITAL ENCOUNTER (OUTPATIENT)
Dept: RADIOLOGY | Facility: CLINIC | Age: 79
Discharge: HOME | End: 2024-12-19
Payer: MEDICARE

## 2024-12-19 ENCOUNTER — APPOINTMENT (OUTPATIENT)
Dept: RADIOLOGY | Facility: CLINIC | Age: 79
End: 2024-12-19
Payer: MEDICARE

## 2024-12-19 DIAGNOSIS — C34.90 LUNG CANCER (MULTI): ICD-10-CM

## 2024-12-19 PROCEDURE — 71260 CT THORAX DX C+: CPT | Performed by: RADIOLOGY

## 2024-12-19 PROCEDURE — 2550000001 HC RX 255 CONTRASTS: Performed by: CLINICAL NURSE SPECIALIST

## 2024-12-19 PROCEDURE — 71260 CT THORAX DX C+: CPT

## 2024-12-23 ENCOUNTER — OFFICE VISIT (OUTPATIENT)
Dept: HEMATOLOGY/ONCOLOGY | Facility: HOSPITAL | Age: 79
End: 2024-12-23
Payer: MEDICARE

## 2024-12-23 VITALS
SYSTOLIC BLOOD PRESSURE: 142 MMHG | HEART RATE: 76 BPM | OXYGEN SATURATION: 97 % | WEIGHT: 237.44 LBS | RESPIRATION RATE: 20 BRPM | BODY MASS INDEX: 35.06 KG/M2 | DIASTOLIC BLOOD PRESSURE: 71 MMHG | TEMPERATURE: 98.1 F

## 2024-12-23 DIAGNOSIS — C34.90 LUNG CANCER (MULTI): ICD-10-CM

## 2024-12-23 PROCEDURE — 99215 OFFICE O/P EST HI 40 MIN: CPT | Performed by: INTERNAL MEDICINE

## 2024-12-23 PROCEDURE — 1126F AMNT PAIN NOTED NONE PRSNT: CPT | Performed by: INTERNAL MEDICINE

## 2024-12-23 PROCEDURE — 3078F DIAST BP <80 MM HG: CPT | Performed by: INTERNAL MEDICINE

## 2024-12-23 PROCEDURE — 3077F SYST BP >= 140 MM HG: CPT | Performed by: INTERNAL MEDICINE

## 2024-12-23 PROCEDURE — 1159F MED LIST DOCD IN RCRD: CPT | Performed by: INTERNAL MEDICINE

## 2024-12-23 ASSESSMENT — PAIN SCALES - GENERAL: PAINLEVEL_OUTOF10: 0-NO PAIN

## 2024-12-23 NOTE — PROGRESS NOTES
Patient ID: Tracey Chiu is a 79 y.o. female.    DIAGNOSIS     Adenocarcinoma of the lung.  Date of diagnosis is March 3 of 2022 where a posterior pleural biopsy demonstrated adenocarcinoma.  Immunohistochemistry positive for CK7 and TTF-1 1.        STAGING     T4 (Mediastinal fat invasion  seen by direct observation during thoracoscopy) N0, M1 a (left pleural involvement)        CURRENT SITES OF DISEASE     ELIZABETH  with mediastinal fat invasion, left pleura        MOLECULAR GENOMICS     PD-L1 immunohistochemistry tumor proportional score 20%  Next generation sequencing using [facility] solid tumor panel  EGFR [id number] mutation positive        SERUM TUMOR MARKER     Normal CEA and CA 19.9 in March 2022        PRIOR THERAPY     1-   Left pleural talc pleurodesis dated March 3, 2022        CURRENT THERAPY     Single agent Tagrisso, started April 14, 2022. Documented response with resolution of left pleural effusion and reduction of left upper lobe lesion in June 2022        CURRENT ONCOLOGICAL PROBLEMS     1-  peripancreatic inflammation/stranding seen on imaging suggestive of pancreatitis, possibly alcohol related.This was seen at time of diagnosis.  Continue to follow pancreatic imaging consider referral for pancreatic evaluation.  MRI of the pancreas  done on July 19, 2022.  Refer to pancreatic surgery for evaluation.  Now followed by Dr. Calderon    2- hospitalized 11/22 for COPD exacerbation        HISTORY OF PRESENT ILLNESS     This is a 79-year-old patient.  She had a fall in October 2021 where she found herself falling down many stairs.  There was apparent loss of consciousness according to the daughter. She was admitted to the hospital and underwent a posterior cervical fusion  for fracture on October 28, 2021.  Reportedly at that time a lung lesion was found and imaging studies including a CT scan was done demonstrating a lesion abutting the left upper lobe.  Upon recovery from her neck surgery further  work-up was performed.  A combined PET/CT dated February a 2022 showed hypermetabolic activity in the left upper lobe mass abutting the mediastinum with no mediastinal lymphadenopathy.  There was some fat stranding and soft tissue fullness along the pancreatic head and proximal  body with mild hypermetabolic activity suggestive of pancreatitis.  There was intense hypermetabolic activity identified within the ethmoid sinuses and to a lesser extent the left maxillary antrum consistent with sinusitis. He had a CT scan of the chest  on March 1 of 2022 showing a left upper lobe mass abutting the left side of the upper mediastinum measuring 3.6 cm in greatest dimension.  There was a small to moderate left pleural effusion which had increased in size compared to the PET/CT of February 8 of 2022 and it was new compared to the CT scan of the chest of October 28 of 2021.  There was scattered bilateral pulmonary nodules however most of these were only a few millimeters and nonsignificant and unchanged compared to October.  There was also  a 2.8 cm hypodense area in the pancreatic head and uncinate process.  There was some mild stranding of the peripancreatic fat which was thought to be related to inflammation around the pancreas.  There was a compression fracture of L1 vertebral body that  was stable. She was admitted to the hospital from March 3 to March 5, 2022 for a video-assisted thorascopic pleural biopsy and talc pleurodesis.Operative report from March 3, 2022 states that about 100 cc of serosanguineous fluid was removed.  There were  small subtle pleural nodules within the posterior inferior part of the pleural that was seen along with diffuse thickening of the pleura anterolaterally which was biopsied.  The tumor seemed to invade the mediastinal fat by inspection.  Talc was inserted  after frozen sections demonstrated malignancy.        PAST MEDICAL HISTORY     1-  nasal polyps s/p surgery in November 2021  2-   hypertension  3-  follow-up in October 2021 resulting in cervical vertebral body fracture s/p cervical posterior fusion on October 28, 2021  4-  pancreatitis?  Seen on PET and CT imaging of February and March 2022.  Pancreatic inflammation is stranding in the setting of possible alcohol abuse.  5-   L4 compression vertebral body fracture seen on imaging studies since February 2022        SOCIAL HISTORY     Patient  lives in [place] on her own.   She has 1 daughter Didi who is an  in [place].  He started smoking at the age of 18.  She stopped in 1994.   She smoked for 25 years 1 pack a day.  She is retired.  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  Patient and daughter report that she has several glasses of wine at night.        CURRENT MEDS     See medication list, meds reviewed        ALLERGIES     patient is allergic to aspirin and has nasal polyps associated with this        FAMILY HISTORY     Patient's father had prostate cancer at the age of 70 patient's mother had ovarian cancer at the age of 86 and patient's brother also had prostate cancer at age 68       Subjective    Cancer  Pertinent negatives include no abdominal pain, anorexia, arthralgias, change in bowel habit, chest pain, chills, congestion, coughing, diaphoresis, fatigue, fever, headaches, joint swelling, myalgias, nausea, neck pain, numbness, rash, sore throat, swollen glands, urinary symptoms, vertigo, visual change, vomiting or weakness.     Is doing well.  We are nearly 3 years out since her diagnosis.  Her nasal polyps are doing better with the treatment she is receiving.  She is breathing fine, no hemoptysis bowel movements are normal except for every once in a while she has an episode of mushy stools, no skin rash but she has brittle skin.    Objective    BSA: 2.29 meters squared  /71 (BP Location: Left arm, Patient Position: Sitting, BP Cuff Size:  Adult)   Pulse 76   Temp 36.7 °C (98.1 °F) (Temporal)   Resp 20   Wt 108 kg (237 lb 7 oz)   LMP  (LMP Unknown)   SpO2 97%   BMI 35.06 kg/m²      Physical Exam  Constitutional:       General: She is not in acute distress.     Appearance: Normal appearance. She is not ill-appearing or diaphoretic.   HENT:      Nose: No congestion or rhinorrhea.      Mouth/Throat:      Pharynx: No oropharyngeal exudate or posterior oropharyngeal erythema.   Eyes:      General: No scleral icterus.     Conjunctiva/sclera: Conjunctivae normal.   Cardiovascular:      Rate and Rhythm: Normal rate and regular rhythm.      Pulses: Normal pulses.      Heart sounds: Normal heart sounds. No murmur heard.     No friction rub. No gallop.   Pulmonary:      Effort: Pulmonary effort is normal. No respiratory distress.      Breath sounds: Normal breath sounds. No stridor. No wheezing, rhonchi or rales.   Chest:      Chest wall: No tenderness.   Abdominal:      General: Abdomen is flat. Bowel sounds are normal. There is no distension.      Palpations: Abdomen is soft. There is no mass.      Tenderness: There is no abdominal tenderness. There is no guarding or rebound.   Musculoskeletal:      Cervical back: No tenderness.      Right lower leg: No edema.      Left lower leg: No edema.   Lymphadenopathy:      Cervical: No cervical adenopathy.   Skin:     General: Skin is warm.      Coloration: Skin is not jaundiced.   Neurological:      General: No focal deficit present.      Mental Status: She is oriented to person, place, and time.   Psychiatric:         Mood and Affect: Mood normal.         Behavior: Behavior normal.         Performance Status:  Asymptomatic     Latest Reference Range & Units 12/16/24 12:54   GLUCOSE 74 - 99 mg/dL 100 (H)   SODIUM 136 - 145 mmol/L 142   POTASSIUM 3.5 - 5.3 mmol/L 4.3   CHLORIDE 98 - 107 mmol/L 106   Bicarbonate 21 - 32 mmol/L 28   Anion Gap 10 - 20 mmol/L 12   Blood Urea Nitrogen 6 - 23 mg/dL 18   Creatinine  0.50 - 1.05 mg/dL 1.07 (H)   EGFR >60 mL/min/1.73m*2 54 (L)   Calcium 8.6 - 10.6 mg/dL 9.1   Albumin 3.4 - 5.0 g/dL 3.7   Alkaline Phosphatase 33 - 136 U/L 124   ALT 7 - 45 U/L 25   AST 9 - 39 U/L 20   Bilirubin Total 0.0 - 1.2 mg/dL 0.7   Total Protein 6.4 - 8.2 g/dL 6.3 (L)   WBC 4.4 - 11.3 x10*3/uL 5.4   nRBC  COMMENT ONLY   RBC 4.00 - 5.20 x10*6/uL 4.24   HEMOGLOBIN 12.0 - 16.0 g/dL 13.6   HEMATOCRIT 36.0 - 46.0 % 41.7   MCV 80 - 100 fL 98   MCH 26.0 - 34.0 pg 32.1   MCHC 32.0 - 36.0 g/dL 32.6   RED CELL DISTRIBUTION WIDTH 11.5 - 14.5 % 13.5   Platelets 150 - 450 x10*3/uL 164   Neutrophils % 40.0 - 80.0 % 58.3   Immature Granulocytes %, Automated 0.0 - 0.9 % 0.0   Lymphocytes % 13.0 - 44.0 % 28.7   Monocytes % 2.0 - 10.0 % 11.9   Eosinophils % 0.0 - 6.0 % 0.7   Basophils % 0.0 - 2.0 % 0.4   Neutrophils Absolute 1.60 - 5.50 x10*3/uL 3.13   Immature Granulocytes Absolute, Automated 0.00 - 0.50 x10*3/uL 0.00   Lymphocytes Absolute 0.80 - 3.00 x10*3/uL 1.54   Monocytes Absolute 0.05 - 0.80 x10*3/uL 0.64   Eosinophils Absolute 0.00 - 0.40 x10*3/uL 0.04   Basophils Absolute 0.00 - 0.10 x10*3/uL 0.02   (H): Data is abnormally high  (L): Data is abnormally low    CT CHEST W IV CONTRAST; 12/19/2024   IMPRESSION:  1.  The size of the left paramediastinal upper lobe spiculated mass  is stable from the 2 prior exams.  2. Again noted air bladder likely sequela of a pleurodesis procedure  on the left. These appear stable.  3. Associated with the superior aspect of the left inter lobar  fissure in the superior segment of the left lower lobe there is a  small nodule measuring 7 mm and does appear to have increased from 4  mm previously. Attention follow-up recommended. This could be due to  slice selection factors though this is not certain.  4. Findings in the upper abdomen which have been previously evaluated  with MRI appear grossly stable.    Assessment/Plan     This is a patient with exon 21 EGFR mutation positive  adenocarcinoma of the lung treated with single agent Tagrisso since April 2022.  She is over 2-1/2 years out since starting her treatment with a good response.  I personally reviewed her most recent CT scan showing no evidence of disease progression.  There is a 7 mm nodule that remains nonspecific and we will continue to evaluate.  Will see her back in 3 months time with blood work.      Cancer Staging   No matching staging information was found for the patient.      Oncology History   Lung cancer (Multi)   8/7/2023 Initial Diagnosis    Lung cancer (CMS/HCC)     11/29/2023 -  Chemotherapy    Osimertinib, 28 Day Cycles                   Mega Angel MD

## 2024-12-24 ASSESSMENT — ENCOUNTER SYMPTOMS
CHILLS: 0
COUGH: 0
JOINT SWELLING: 0
HEADACHES: 0
ANOREXIA: 0
VISUAL CHANGE: 0
NECK PAIN: 0
NAUSEA: 0
NUMBNESS: 0
FATIGUE: 0
VERTIGO: 0
SWOLLEN GLANDS: 0
SORE THROAT: 0
ARTHRALGIAS: 0
ABDOMINAL PAIN: 0
FEVER: 0
VOMITING: 0
MYALGIAS: 0
CHANGE IN BOWEL HABIT: 0
DIAPHORESIS: 0
WEAKNESS: 0

## 2024-12-30 DIAGNOSIS — I10 HYPERTENSION, UNSPECIFIED TYPE: ICD-10-CM

## 2025-01-02 RX ORDER — METOPROLOL SUCCINATE 50 MG/1
50 TABLET, EXTENDED RELEASE ORAL DAILY
Qty: 90 TABLET | Refills: 0 | Status: SHIPPED | OUTPATIENT
Start: 2025-01-02

## 2025-01-03 DIAGNOSIS — I10 HYPERTENSION, UNSPECIFIED TYPE: ICD-10-CM

## 2025-01-03 RX ORDER — METOPROLOL SUCCINATE 50 MG/1
50 TABLET, EXTENDED RELEASE ORAL DAILY
Qty: 90 TABLET | Refills: 0 | OUTPATIENT
Start: 2025-01-03

## 2025-01-07 ENCOUNTER — APPOINTMENT (OUTPATIENT)
Dept: PRIMARY CARE | Facility: CLINIC | Age: 80
End: 2025-01-07
Payer: MEDICARE

## 2025-01-07 VITALS
BODY MASS INDEX: 35.15 KG/M2 | OXYGEN SATURATION: 99 % | HEART RATE: 67 BPM | DIASTOLIC BLOOD PRESSURE: 77 MMHG | SYSTOLIC BLOOD PRESSURE: 148 MMHG | TEMPERATURE: 97.7 F | WEIGHT: 238 LBS

## 2025-01-07 DIAGNOSIS — Z86.79 HISTORY OF ATRIAL FIBRILLATION: ICD-10-CM

## 2025-01-07 DIAGNOSIS — N18.31 STAGE 3A CHRONIC KIDNEY DISEASE (MULTI): ICD-10-CM

## 2025-01-07 DIAGNOSIS — E66.812 CLASS 2 SEVERE OBESITY DUE TO EXCESS CALORIES WITH SERIOUS COMORBIDITY AND BODY MASS INDEX (BMI) OF 35.0 TO 35.9 IN ADULT: ICD-10-CM

## 2025-01-07 DIAGNOSIS — E66.01 CLASS 2 SEVERE OBESITY DUE TO EXCESS CALORIES WITH SERIOUS COMORBIDITY AND BODY MASS INDEX (BMI) OF 35.0 TO 35.9 IN ADULT: ICD-10-CM

## 2025-01-07 DIAGNOSIS — Z00.00 MEDICARE ANNUAL WELLNESS VISIT, SUBSEQUENT: Primary | ICD-10-CM

## 2025-01-07 DIAGNOSIS — J45.909 ASTHMA, UNSPECIFIED ASTHMA SEVERITY, UNSPECIFIED WHETHER COMPLICATED, UNSPECIFIED WHETHER PERSISTENT (HHS-HCC): ICD-10-CM

## 2025-01-07 DIAGNOSIS — I10 HYPERTENSION, UNSPECIFIED TYPE: ICD-10-CM

## 2025-01-07 DIAGNOSIS — Z13.220 SCREENING, LIPID: ICD-10-CM

## 2025-01-07 DIAGNOSIS — Z12.11 COLON CANCER SCREENING: ICD-10-CM

## 2025-01-07 DIAGNOSIS — C34.90 MALIGNANT NEOPLASM OF LUNG, UNSPECIFIED LATERALITY, UNSPECIFIED PART OF LUNG (MULTI): ICD-10-CM

## 2025-01-07 DIAGNOSIS — D37.8 INTRADUCTAL PAPILLARY MUCINOUS TUMOR OF UNCERTAIN BEHAVIOR OF PANCREAS: ICD-10-CM

## 2025-01-07 DIAGNOSIS — Z12.31 SCREENING MAMMOGRAM FOR BREAST CANCER: ICD-10-CM

## 2025-01-07 DIAGNOSIS — I48.0 PAROXYSMAL ATRIAL FIBRILLATION WITH RVR (MULTI): ICD-10-CM

## 2025-01-07 PROBLEM — E66.09 CLASS 1 OBESITY DUE TO EXCESS CALORIES WITH SERIOUS COMORBIDITY AND BODY MASS INDEX (BMI) OF 32.0 TO 32.9 IN ADULT: Status: RESOLVED | Noted: 2024-06-18 | Resolved: 2025-01-07

## 2025-01-07 PROBLEM — E66.811 CLASS 1 OBESITY DUE TO EXCESS CALORIES WITH SERIOUS COMORBIDITY AND BODY MASS INDEX (BMI) OF 32.0 TO 32.9 IN ADULT: Status: RESOLVED | Noted: 2024-06-18 | Resolved: 2025-01-07

## 2025-01-07 PROBLEM — J33.9 NASAL POLYP: Status: ACTIVE | Noted: 2025-01-07

## 2025-01-07 PROBLEM — R91.8 MULTIPLE PULMONARY NODULES: Status: ACTIVE | Noted: 2025-01-07

## 2025-01-07 PROCEDURE — 3077F SYST BP >= 140 MM HG: CPT | Performed by: PEDIATRICS

## 2025-01-07 PROCEDURE — 1160F RVW MEDS BY RX/DR IN RCRD: CPT | Performed by: PEDIATRICS

## 2025-01-07 PROCEDURE — G0439 PPPS, SUBSEQ VISIT: HCPCS | Performed by: PEDIATRICS

## 2025-01-07 PROCEDURE — 1124F ACP DISCUSS-NO DSCNMKR DOCD: CPT | Performed by: PEDIATRICS

## 2025-01-07 PROCEDURE — 1170F FXNL STATUS ASSESSED: CPT | Performed by: PEDIATRICS

## 2025-01-07 PROCEDURE — 3078F DIAST BP <80 MM HG: CPT | Performed by: PEDIATRICS

## 2025-01-07 PROCEDURE — 99214 OFFICE O/P EST MOD 30 MIN: CPT | Performed by: PEDIATRICS

## 2025-01-07 PROCEDURE — 1159F MED LIST DOCD IN RCRD: CPT | Performed by: PEDIATRICS

## 2025-01-07 RX ORDER — LOSARTAN POTASSIUM 25 MG/1
25 TABLET ORAL DAILY
Qty: 100 TABLET | Refills: 0 | Status: SHIPPED | OUTPATIENT
Start: 2025-01-07 | End: 2026-02-11

## 2025-01-07 RX ORDER — METOPROLOL SUCCINATE 50 MG/1
50 TABLET, EXTENDED RELEASE ORAL DAILY
Qty: 90 TABLET | Refills: 0 | Status: SHIPPED | OUTPATIENT
Start: 2025-01-07

## 2025-01-07 ASSESSMENT — ACTIVITIES OF DAILY LIVING (ADL)
DOING_HOUSEWORK: INDEPENDENT
BATHING: INDEPENDENT
GROCERY_SHOPPING: INDEPENDENT
MANAGING_FINANCES: INDEPENDENT
DRESSING: INDEPENDENT
TAKING_MEDICATION: INDEPENDENT

## 2025-01-07 NOTE — PATIENT INSTRUCTIONS
Avoid chips, cookies, egg yolks, elizalde,   No more than 5 oz of wine a day  Identify a power of  for healthcare.  Consider seeing Dr Keenan Mason cardiologist  Consider MRI for pancreas   Drink 6 to 8 glasses of water daily and get fasting blood test and urine test next week  Resume Losartan 25 and return in 3 months

## 2025-01-07 NOTE — PROGRESS NOTES
Subjective   Patient ID: Tracey Chiu is a 79 y.o. female who presents for Medicare Wellness.    HPI   Obesity  Eats eggs twice a week, toast, pancakes, english muffin; occ elizalde, one glass of juice  Dinner--eats out or eats chicken, ham, chilli, occ salad, cooked veggies 4 times a week  Snack--cookies; potato chips, nuts,   9 ounces of wine a day  Dexa normal 2023  Declines colonoscopy but willing to do Cologuard  Mammogram due next month  Review of Systems  No CP or SOB  Objective   /77   Pulse 67   Temp 36.5 °C (97.7 °F)   Wt 108 kg (238 lb)   LMP  (LMP Unknown)   SpO2 99%   BMI 35.15 kg/m²     Physical Exam  Bilateral ear wax  No bruits or thyromegaly  Lungs clear  Heart RRR  Abd soft  No edema  Assessment/Plan   Problem List Items Addressed This Visit             ICD-10-CM    Asthma J45.909     Has not needed albuterol for a while         Hypertension I10    Relevant Medications    losartan (Cozaar) 25 mg tablet    metoprolol succinate XL (Toprol-XL) 50 mg 24 hr tablet    Other Relevant Orders    Basic Metabolic Panel    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     Patient declines MRI at this time         Lung cancer (Multi) C34.90    Paroxysmal atrial fibrillation with RVR (Multi) I48.0    Relevant Medications    metoprolol succinate XL (Toprol-XL) 50 mg 24 hr tablet    Stage 3a chronic kidney disease (Multi) N18.31    Relevant Orders    Albumin-Creatinine Ratio, Urine Random    Urinalysis with Reflex Microscopic    RESOLVED: History of atrial fibrillation Z86.79    Class 2 severe obesity due to excess calories with serious comorbidity and body mass index (BMI) of 35.0 to 35.9 in adult E66.812, E66.01, Z68.35     Other Visit Diagnoses         Codes    Medicare annual wellness visit, subsequent    -  Primary Z00.00    Screening mammogram for breast cancer     Z12.31    Relevant Orders    BI mammo bilateral screening tomosynthesis    Screening, lipid      Z13.220    Relevant Orders    Lipid Panel    Colon cancer screening     Z12.11    Relevant Orders    Cologuard® colon cancer screening

## 2025-01-11 PROCEDURE — RXMED WILLOW AMBULATORY MEDICATION CHARGE

## 2025-01-13 ENCOUNTER — SPECIALTY PHARMACY (OUTPATIENT)
Dept: PHARMACY | Facility: CLINIC | Age: 80
End: 2025-01-13

## 2025-01-15 ENCOUNTER — PHARMACY VISIT (OUTPATIENT)
Dept: PHARMACY | Facility: CLINIC | Age: 80
End: 2025-01-15
Payer: COMMERCIAL

## 2025-02-05 LAB — NONINV COLON CA DNA+OCC BLD SCRN STL QL: NEGATIVE

## 2025-02-10 ENCOUNTER — SPECIALTY PHARMACY (OUTPATIENT)
Dept: PHARMACY | Facility: CLINIC | Age: 80
End: 2025-02-10

## 2025-02-10 PROCEDURE — RXMED WILLOW AMBULATORY MEDICATION CHARGE

## 2025-02-12 ENCOUNTER — PHARMACY VISIT (OUTPATIENT)
Dept: PHARMACY | Facility: CLINIC | Age: 80
End: 2025-02-12
Payer: COMMERCIAL

## 2025-03-07 DIAGNOSIS — Z00.00 HEALTHCARE MAINTENANCE: Primary | ICD-10-CM

## 2025-03-11 ENCOUNTER — SPECIALTY PHARMACY (OUTPATIENT)
Dept: PHARMACY | Facility: CLINIC | Age: 80
End: 2025-03-11

## 2025-03-11 PROCEDURE — RXMED WILLOW AMBULATORY MEDICATION CHARGE

## 2025-03-13 ENCOUNTER — PHARMACY VISIT (OUTPATIENT)
Dept: PHARMACY | Facility: CLINIC | Age: 80
End: 2025-03-13
Payer: COMMERCIAL

## 2025-03-19 LAB — MEV IGG SER IA-ACNC: >300 AU/ML

## 2025-03-20 LAB
ALBUMIN/CREAT UR: 28 MG/G CREAT
ANION GAP SERPL CALCULATED.4IONS-SCNC: 9 MMOL/L (CALC) (ref 7–17)
BUN SERPL-MCNC: 15 MG/DL (ref 7–25)
BUN/CREAT SERPL: NORMAL (CALC) (ref 6–22)
CALCIUM SERPL-MCNC: 8.9 MG/DL (ref 8.6–10.4)
CHLORIDE SERPL-SCNC: 104 MMOL/L (ref 98–110)
CHOLEST SERPL-MCNC: 204 MG/DL
CHOLEST/HDLC SERPL: 2.8 (CALC)
CO2 SERPL-SCNC: 27 MMOL/L (ref 20–32)
COLOR UR: NORMAL
CREAT SERPL-MCNC: 0.95 MG/DL (ref 0.6–1)
CREAT UR-MCNC: 120 MG/DL (ref 20–275)
EGFRCR SERPLBLD CKD-EPI 2021: 61 ML/MIN/1.73M2
GLUCOSE SERPL-MCNC: 92 MG/DL (ref 65–99)
HDLC SERPL-MCNC: 72 MG/DL
LDLC SERPL CALC-MCNC: 112 MG/DL (CALC)
MICROALBUMIN UR-MCNC: 3.3 MG/DL
NONHDLC SERPL-MCNC: 132 MG/DL (CALC)
POTASSIUM SERPL-SCNC: 4.6 MMOL/L (ref 3.5–5.3)
SODIUM SERPL-SCNC: 140 MMOL/L (ref 135–146)
TRIGL SERPL-MCNC: 98 MG/DL

## 2025-03-24 ENCOUNTER — OFFICE VISIT (OUTPATIENT)
Dept: HEMATOLOGY/ONCOLOGY | Facility: HOSPITAL | Age: 80
End: 2025-03-24
Payer: MEDICARE

## 2025-03-24 ENCOUNTER — LAB (OUTPATIENT)
Dept: LAB | Facility: HOSPITAL | Age: 80
End: 2025-03-24
Payer: MEDICARE

## 2025-03-24 ENCOUNTER — SPECIALTY PHARMACY (OUTPATIENT)
Dept: HEMATOLOGY/ONCOLOGY | Facility: HOSPITAL | Age: 80
End: 2025-03-24
Payer: MEDICARE

## 2025-03-24 VITALS
BODY MASS INDEX: 36.75 KG/M2 | SYSTOLIC BLOOD PRESSURE: 138 MMHG | OXYGEN SATURATION: 97 % | WEIGHT: 242.51 LBS | HEART RATE: 77 BPM | DIASTOLIC BLOOD PRESSURE: 65 MMHG | TEMPERATURE: 97 F | RESPIRATION RATE: 20 BRPM | HEIGHT: 68 IN

## 2025-03-24 DIAGNOSIS — Z13.220 SCREENING, LIPID: ICD-10-CM

## 2025-03-24 DIAGNOSIS — C34.90 LUNG CANCER (MULTI): ICD-10-CM

## 2025-03-24 DIAGNOSIS — N18.31 STAGE 3A CHRONIC KIDNEY DISEASE (MULTI): ICD-10-CM

## 2025-03-24 LAB
ALBUMIN SERPL BCP-MCNC: 3.8 G/DL (ref 3.4–5)
ALP SERPL-CCNC: 88 U/L (ref 33–136)
ALT SERPL W P-5'-P-CCNC: 23 U/L (ref 7–45)
ANION GAP SERPL CALC-SCNC: 11 MMOL/L (ref 10–20)
AST SERPL W P-5'-P-CCNC: 21 U/L (ref 9–39)
BASOPHILS # BLD AUTO: 0.02 X10*3/UL (ref 0–0.1)
BASOPHILS NFR BLD AUTO: 0.4 %
BILIRUB SERPL-MCNC: 0.6 MG/DL (ref 0–1.2)
BUN SERPL-MCNC: 17 MG/DL (ref 6–23)
CALCIUM SERPL-MCNC: 9.1 MG/DL (ref 8.6–10.3)
CHLORIDE SERPL-SCNC: 106 MMOL/L (ref 98–107)
CO2 SERPL-SCNC: 27 MMOL/L (ref 21–32)
CREAT SERPL-MCNC: 0.98 MG/DL (ref 0.5–1.05)
EGFRCR SERPLBLD CKD-EPI 2021: 59 ML/MIN/1.73M*2
EOSINOPHIL # BLD AUTO: 0.03 X10*3/UL (ref 0–0.4)
EOSINOPHIL NFR BLD AUTO: 0.5 %
ERYTHROCYTE [DISTWIDTH] IN BLOOD BY AUTOMATED COUNT: 14.1 % (ref 11.5–14.5)
GLUCOSE SERPL-MCNC: 111 MG/DL (ref 74–99)
HCT VFR BLD AUTO: 39.5 % (ref 36–46)
HGB BLD-MCNC: 12.9 G/DL (ref 12–16)
HOLD SPECIMEN: NORMAL
IMM GRANULOCYTES # BLD AUTO: 0.02 X10*3/UL (ref 0–0.5)
IMM GRANULOCYTES NFR BLD AUTO: 0.4 % (ref 0–0.9)
LYMPHOCYTES # BLD AUTO: 1.51 X10*3/UL (ref 0.8–3)
LYMPHOCYTES NFR BLD AUTO: 26.5 %
MCH RBC QN AUTO: 31.9 PG (ref 26–34)
MCHC RBC AUTO-ENTMCNC: 32.7 G/DL (ref 32–36)
MCV RBC AUTO: 98 FL (ref 80–100)
MONOCYTES # BLD AUTO: 0.69 X10*3/UL (ref 0.05–0.8)
MONOCYTES NFR BLD AUTO: 12.1 %
NEUTROPHILS # BLD AUTO: 3.42 X10*3/UL (ref 1.6–5.5)
NEUTROPHILS NFR BLD AUTO: 60.1 %
NRBC BLD-RTO: 0 /100 WBCS (ref 0–0)
PLATELET # BLD AUTO: 173 X10*3/UL (ref 150–450)
POTASSIUM SERPL-SCNC: 4.2 MMOL/L (ref 3.5–5.3)
PROT SERPL-MCNC: 6.6 G/DL (ref 6.4–8.2)
RBC # BLD AUTO: 4.04 X10*6/UL (ref 4–5.2)
SODIUM SERPL-SCNC: 140 MMOL/L (ref 136–145)
WBC # BLD AUTO: 5.7 X10*3/UL (ref 4.4–11.3)

## 2025-03-24 PROCEDURE — 99214 OFFICE O/P EST MOD 30 MIN: CPT | Performed by: CLINICAL NURSE SPECIALIST

## 2025-03-24 PROCEDURE — 36415 COLL VENOUS BLD VENIPUNCTURE: CPT

## 2025-03-24 PROCEDURE — 1126F AMNT PAIN NOTED NONE PRSNT: CPT | Performed by: CLINICAL NURSE SPECIALIST

## 2025-03-24 PROCEDURE — 80053 COMPREHEN METABOLIC PANEL: CPT

## 2025-03-24 PROCEDURE — 85025 COMPLETE CBC W/AUTO DIFF WBC: CPT

## 2025-03-24 PROCEDURE — 3078F DIAST BP <80 MM HG: CPT | Performed by: CLINICAL NURSE SPECIALIST

## 2025-03-24 PROCEDURE — 3075F SYST BP GE 130 - 139MM HG: CPT | Performed by: CLINICAL NURSE SPECIALIST

## 2025-03-24 ASSESSMENT — PAIN SCALES - GENERAL: PAINLEVEL_OUTOF10: 0-NO PAIN

## 2025-03-31 ASSESSMENT — ENCOUNTER SYMPTOMS
SWOLLEN GLANDS: 0
NUMBNESS: 0
COUGH: 0
ABDOMINAL PAIN: 0
CHANGE IN BOWEL HABIT: 0
WEAKNESS: 0
NECK PAIN: 0
ARTHRALGIAS: 0
VISUAL CHANGE: 0
ANOREXIA: 0
FEVER: 0
HEADACHES: 0
VOMITING: 0
MYALGIAS: 0
CHILLS: 0
NAUSEA: 0
JOINT SWELLING: 0
SORE THROAT: 0
DIAPHORESIS: 0
FATIGUE: 0
VERTIGO: 0

## 2025-04-08 ENCOUNTER — APPOINTMENT (OUTPATIENT)
Dept: PRIMARY CARE | Facility: CLINIC | Age: 80
End: 2025-04-08
Payer: MEDICARE

## 2025-04-08 VITALS
WEIGHT: 246 LBS | SYSTOLIC BLOOD PRESSURE: 130 MMHG | OXYGEN SATURATION: 94 % | DIASTOLIC BLOOD PRESSURE: 84 MMHG | BODY MASS INDEX: 37.28 KG/M2 | HEIGHT: 68 IN | TEMPERATURE: 96.6 F | HEART RATE: 82 BPM

## 2025-04-08 DIAGNOSIS — S13.170S: ICD-10-CM

## 2025-04-08 DIAGNOSIS — E66.01 CLASS 2 SEVERE OBESITY DUE TO EXCESS CALORIES WITH SERIOUS COMORBIDITY AND BODY MASS INDEX (BMI) OF 35.0 TO 35.9 IN ADULT: ICD-10-CM

## 2025-04-08 DIAGNOSIS — J45.909 ASTHMA, UNSPECIFIED ASTHMA SEVERITY, UNSPECIFIED WHETHER COMPLICATED, UNSPECIFIED WHETHER PERSISTENT (HHS-HCC): ICD-10-CM

## 2025-04-08 DIAGNOSIS — E78.5 HYPERLIPIDEMIA, UNSPECIFIED HYPERLIPIDEMIA TYPE: ICD-10-CM

## 2025-04-08 DIAGNOSIS — C34.90 MALIGNANT NEOPLASM OF LUNG, UNSPECIFIED LATERALITY, UNSPECIFIED PART OF LUNG (MULTI): ICD-10-CM

## 2025-04-08 DIAGNOSIS — E66.812 CLASS 2 SEVERE OBESITY DUE TO EXCESS CALORIES WITH SERIOUS COMORBIDITY AND BODY MASS INDEX (BMI) OF 35.0 TO 35.9 IN ADULT: ICD-10-CM

## 2025-04-08 DIAGNOSIS — I10 HYPERTENSION, UNSPECIFIED TYPE: Primary | ICD-10-CM

## 2025-04-08 DIAGNOSIS — I48.0 PAROXYSMAL ATRIAL FIBRILLATION WITH RVR (MULTI): ICD-10-CM

## 2025-04-08 DIAGNOSIS — E55.9 VITAMIN D DEFICIENCY: ICD-10-CM

## 2025-04-08 DIAGNOSIS — J33.9 NASAL POLYPOSIS: ICD-10-CM

## 2025-04-08 DIAGNOSIS — N18.31 STAGE 3A CHRONIC KIDNEY DISEASE (MULTI): ICD-10-CM

## 2025-04-08 PROCEDURE — 99213 OFFICE O/P EST LOW 20 MIN: CPT | Performed by: PEDIATRICS

## 2025-04-08 PROCEDURE — 3079F DIAST BP 80-89 MM HG: CPT | Performed by: PEDIATRICS

## 2025-04-08 PROCEDURE — 3075F SYST BP GE 130 - 139MM HG: CPT | Performed by: PEDIATRICS

## 2025-04-08 PROCEDURE — 1159F MED LIST DOCD IN RCRD: CPT | Performed by: PEDIATRICS

## 2025-04-08 NOTE — ASSESSMENT & PLAN NOTE
Reports having one episode of afib. Not on anticoagulants. Has referral for cardiology, needs to schedule.

## 2025-04-08 NOTE — ASSESSMENT & PLAN NOTE
Breakfast - toast, bagel, english muffin, sometimes pancakes, elizalde, and eggs  Dinner - hamburgers with frozen patties, chicken, pasta with tomato based sauce    Plans on walking more with better weather

## 2025-04-08 NOTE — ASSESSMENT & PLAN NOTE
eGFR 59 two weeks ago  Albumin creatinine ratio three weeks ago normal   Island Pedicle Flap With Canthal Suspension Text: The defect edges were debeveled with a #15 scalpel blade.  Given the location of the defect, shape of the defect and the proximity to free margins an island pedicle advancement flap was deemed most appropriate.  Using a sterile surgical marker, an appropriate advancement flap was drawn incorporating the defect, outlining the appropriate donor tissue and placing the expected incisions within the relaxed skin tension lines where possible. The area thus outlined was incised deep to adipose tissue with a #15 scalpel blade.  The skin margins were undermined to an appropriate distance in all directions around the primary defect and laterally outward around the island pedicle utilizing iris scissors.  There was minimal undermining beneath the pedicle flap. A suspension suture was placed in the canthal tendon to prevent tension and prevent ectropion.

## 2025-04-08 NOTE — PROGRESS NOTES
"Subjective   Patient ID: Tracey Chiu is a 80 y.o. female who presents for Hypertension, Atrial Fibrillation, and Obesity.    HPI   Here for HTN follow up after restarting losartan. Reports no issues since taking it again.   Home -120/70-80  Cologuard negative;   Mammogram still not done  Dexa normal in 2023  Review of Systems    Objective   /84 (BP Location: Left arm)   Pulse 82   Temp 35.9 °C (96.6 °F) (Temporal)   Ht 1.727 m (5' 8\")   Wt 112 kg (246 lb)   LMP  (LMP Unknown)   SpO2 94%   BMI 37.40 kg/m²     Physical Exam  Lungs clear  Heart rRR  Nasal polyps  Assessment/Plan   Problem List Items Addressed This Visit             ICD-10-CM    Asthma J45.909     Has never needed albuterol rescue inhaler         Hypertension - Primary I10     On metoprolol and losartan. At home -120/70-80s         Lung cancer (Multi) C34.90     Sees Mega Angel MD and Taniya HOLT-CNS quarterly, gets CT scans twice a year. Stable.          Nasal polyposis J33.9     Xhance is no longer covered. Is now taking Nucala subq, this is helping.          Subluxation of C6-C7 cervical vertebrae S13.170A     Limited ROM         Vitamin D deficiency E55.9     Taking supplements         Paroxysmal atrial fibrillation with RVR (Multi) I48.0     Reports having one episode of afib. Not on anticoagulants. Has referral for cardiology, needs to schedule.          Stage 3a chronic kidney disease (Multi) N18.31     eGFR 59 two weeks ago  Albumin creatinine ratio three weeks ago normal         Class 2 severe obesity due to excess calories with serious comorbidity and body mass index (BMI) of 35.0 to 35.9 in adult E66.812, E66.01, Z68.35     Breakfast - toast, bagel, english muffin, sometimes pancakes, elizalde, and eggs  Dinner - hamburgers with frozen patties, chicken, pasta with tomato based sauce    Plans on walking more with better weather               "

## 2025-04-14 ENCOUNTER — SPECIALTY PHARMACY (OUTPATIENT)
Dept: PHARMACY | Facility: CLINIC | Age: 80
End: 2025-04-14

## 2025-04-14 DIAGNOSIS — C34.90 MALIGNANT NEOPLASM OF LUNG, UNSPECIFIED LATERALITY, UNSPECIFIED PART OF LUNG (MULTI): ICD-10-CM

## 2025-04-14 PROCEDURE — RXMED WILLOW AMBULATORY MEDICATION CHARGE

## 2025-04-16 ENCOUNTER — PHARMACY VISIT (OUTPATIENT)
Dept: PHARMACY | Facility: CLINIC | Age: 80
End: 2025-04-16
Payer: COMMERCIAL

## 2025-05-08 PROCEDURE — RXMED WILLOW AMBULATORY MEDICATION CHARGE

## 2025-05-12 ENCOUNTER — SPECIALTY PHARMACY (OUTPATIENT)
Dept: PHARMACY | Facility: CLINIC | Age: 80
End: 2025-05-12

## 2025-05-14 ENCOUNTER — PHARMACY VISIT (OUTPATIENT)
Dept: PHARMACY | Facility: CLINIC | Age: 80
End: 2025-05-14
Payer: COMMERCIAL

## 2025-06-06 PROCEDURE — RXMED WILLOW AMBULATORY MEDICATION CHARGE

## 2025-06-10 ENCOUNTER — SPECIALTY PHARMACY (OUTPATIENT)
Dept: PHARMACY | Facility: CLINIC | Age: 80
End: 2025-06-10

## 2025-06-12 ENCOUNTER — PHARMACY VISIT (OUTPATIENT)
Dept: PHARMACY | Facility: CLINIC | Age: 80
End: 2025-06-12
Payer: COMMERCIAL

## 2025-06-12 ENCOUNTER — HOSPITAL ENCOUNTER (OUTPATIENT)
Dept: RADIOLOGY | Facility: CLINIC | Age: 80
End: 2025-06-12
Payer: MEDICARE

## 2025-06-19 ENCOUNTER — HOSPITAL ENCOUNTER (OUTPATIENT)
Dept: RADIOLOGY | Facility: CLINIC | Age: 80
Discharge: HOME | End: 2025-06-19
Payer: MEDICARE

## 2025-06-19 ENCOUNTER — APPOINTMENT (OUTPATIENT)
Dept: RADIOLOGY | Facility: CLINIC | Age: 80
End: 2025-06-19
Payer: MEDICARE

## 2025-06-19 ENCOUNTER — LAB (OUTPATIENT)
Dept: LAB | Facility: CLINIC | Age: 80
End: 2025-06-19
Payer: MEDICARE

## 2025-06-19 DIAGNOSIS — C34.90 LUNG CANCER (MULTI): ICD-10-CM

## 2025-06-19 LAB
ALBUMIN SERPL BCP-MCNC: 3.9 G/DL (ref 3.4–5)
ALP SERPL-CCNC: 101 U/L (ref 33–136)
ALT SERPL W P-5'-P-CCNC: 19 U/L (ref 7–45)
ANION GAP SERPL CALC-SCNC: 14 MMOL/L (ref 10–20)
AST SERPL W P-5'-P-CCNC: 21 U/L (ref 9–39)
BASOPHILS # BLD AUTO: 0.04 X10*3/UL (ref 0–0.1)
BASOPHILS NFR BLD AUTO: 0.7 %
BILIRUB SERPL-MCNC: 0.6 MG/DL (ref 0–1.2)
BUN SERPL-MCNC: 14 MG/DL (ref 6–23)
CALCIUM SERPL-MCNC: 9.1 MG/DL (ref 8.6–10.6)
CHLORIDE SERPL-SCNC: 104 MMOL/L (ref 98–107)
CO2 SERPL-SCNC: 26 MMOL/L (ref 21–32)
CREAT SERPL-MCNC: 0.99 MG/DL (ref 0.5–1.05)
EGFRCR SERPLBLD CKD-EPI 2021: 58 ML/MIN/1.73M*2
EOSINOPHIL # BLD AUTO: 0.04 X10*3/UL (ref 0–0.4)
EOSINOPHIL NFR BLD AUTO: 0.7 %
ERYTHROCYTE [DISTWIDTH] IN BLOOD BY AUTOMATED COUNT: 12.7 % (ref 11.5–14.5)
GLUCOSE SERPL-MCNC: 90 MG/DL (ref 74–99)
HCT VFR BLD AUTO: 41.8 % (ref 36–46)
HGB BLD-MCNC: 13.7 G/DL (ref 12–16)
IMM GRANULOCYTES # BLD AUTO: 0.01 X10*3/UL (ref 0–0.5)
IMM GRANULOCYTES NFR BLD AUTO: 0.2 % (ref 0–0.9)
LYMPHOCYTES # BLD AUTO: 1.57 X10*3/UL (ref 0.8–3)
LYMPHOCYTES NFR BLD AUTO: 28.7 %
MCH RBC QN AUTO: 32.3 PG (ref 26–34)
MCHC RBC AUTO-ENTMCNC: 32.8 G/DL (ref 32–36)
MCV RBC AUTO: 99 FL (ref 80–100)
MONOCYTES # BLD AUTO: 0.68 X10*3/UL (ref 0.05–0.8)
MONOCYTES NFR BLD AUTO: 12.4 %
NEUTROPHILS # BLD AUTO: 3.13 X10*3/UL (ref 1.6–5.5)
NEUTROPHILS NFR BLD AUTO: 57.3 %
NRBC BLD-RTO: NORMAL /100{WBCS}
PLATELET # BLD AUTO: 190 X10*3/UL (ref 150–450)
POTASSIUM SERPL-SCNC: 4.3 MMOL/L (ref 3.5–5.3)
PROT SERPL-MCNC: 6.4 G/DL (ref 6.4–8.2)
RBC # BLD AUTO: 4.24 X10*6/UL (ref 4–5.2)
SODIUM SERPL-SCNC: 140 MMOL/L (ref 136–145)
WBC # BLD AUTO: 5.5 X10*3/UL (ref 4.4–11.3)

## 2025-06-19 PROCEDURE — 80053 COMPREHEN METABOLIC PANEL: CPT

## 2025-06-19 PROCEDURE — 36415 COLL VENOUS BLD VENIPUNCTURE: CPT

## 2025-06-19 PROCEDURE — 71250 CT THORAX DX C-: CPT

## 2025-06-19 PROCEDURE — 85025 COMPLETE CBC W/AUTO DIFF WBC: CPT

## 2025-06-23 ENCOUNTER — OFFICE VISIT (OUTPATIENT)
Dept: HEMATOLOGY/ONCOLOGY | Facility: HOSPITAL | Age: 80
End: 2025-06-23
Payer: MEDICARE

## 2025-06-23 VITALS
RESPIRATION RATE: 18 BRPM | TEMPERATURE: 95.9 F | BODY MASS INDEX: 37.07 KG/M2 | SYSTOLIC BLOOD PRESSURE: 136 MMHG | HEART RATE: 79 BPM | DIASTOLIC BLOOD PRESSURE: 83 MMHG | OXYGEN SATURATION: 93 % | WEIGHT: 243.83 LBS

## 2025-06-23 DIAGNOSIS — C34.90 LUNG CANCER (MULTI): ICD-10-CM

## 2025-06-23 PROCEDURE — 3079F DIAST BP 80-89 MM HG: CPT | Performed by: INTERNAL MEDICINE

## 2025-06-23 PROCEDURE — 99215 OFFICE O/P EST HI 40 MIN: CPT | Performed by: INTERNAL MEDICINE

## 2025-06-23 PROCEDURE — 1159F MED LIST DOCD IN RCRD: CPT | Performed by: INTERNAL MEDICINE

## 2025-06-23 PROCEDURE — 3075F SYST BP GE 130 - 139MM HG: CPT | Performed by: INTERNAL MEDICINE

## 2025-06-23 PROCEDURE — 1126F AMNT PAIN NOTED NONE PRSNT: CPT | Performed by: INTERNAL MEDICINE

## 2025-06-23 ASSESSMENT — PAIN SCALES - GENERAL: PAINLEVEL_OUTOF10: 0-NO PAIN

## 2025-06-23 NOTE — PATIENT INSTRUCTIONS
Orders placed today:    -follow up appointment to see LISA Dee in 3 months (9/22)  -blood work prior    If you have any questions, please call us at [phone].

## 2025-06-23 NOTE — PROGRESS NOTES
Patient ID: Tracey Chiu is a 80 y.o. female.    DIAGNOSIS     Adenocarcinoma of the lung.  Date of diagnosis is March 3 of 2022 where a posterior pleural biopsy demonstrated adenocarcinoma.  Immunohistochemistry positive for CK7 and TTF-1 1.        STAGING     T4 (Mediastinal fat invasion  seen by direct observation during thoracoscopy) N0, M1 a (left pleural involvement)        CURRENT SITES OF DISEASE     ELIZABETH  with mediastinal fat invasion, left pleura        MOLECULAR GENOMICS     PD-L1 immunohistochemistry tumor proportional score 20%  Next generation sequencing using [facility] solid tumor panel  EGFR [id number] mutation positive        SERUM TUMOR MARKER     Normal CEA and CA 19.9 in March 2022        PRIOR THERAPY     1-   Left pleural talc pleurodesis dated March 3, 2022        CURRENT THERAPY     Single agent Tagrisso, started April 14, 2022. Documented response with resolution of left pleural effusion and reduction of left upper lobe lesion in June 2022        CURRENT ONCOLOGICAL PROBLEMS     1-  peripancreatic inflammation/stranding seen on imaging suggestive of pancreatitis, possibly alcohol related.This was seen at time of diagnosis.  Continue to follow pancreatic imaging consider referral for pancreatic evaluation.  MRI of the pancreas  done on July 19, 2022.  Refer to pancreatic surgery for evaluation.  Now followed by Dr. Calderon     2- hospitalized 11/22 for COPD exacerbation        HISTORY OF PRESENT ILLNESS     This is a 79-year-old patient.  She had a fall in October 2021 where she found herself falling down many stairs.  There was apparent loss of consciousness according to the daughter. She was admitted to the hospital and underwent a posterior cervical fusion  for fracture on October 28, 2021.  Reportedly at that time a lung lesion was found and imaging studies including a CT scan was done demonstrating a lesion abutting the left upper lobe.  Upon recovery from her neck surgery  further work-up was performed.  A combined PET/CT dated February a 2022 showed hypermetabolic activity in the left upper lobe mass abutting the mediastinum with no mediastinal lymphadenopathy.  There was some fat stranding and soft tissue fullness along the pancreatic head and proximal  body with mild hypermetabolic activity suggestive of pancreatitis.  There was intense hypermetabolic activity identified within the ethmoid sinuses and to a lesser extent the left maxillary antrum consistent with sinusitis. He had a CT scan of the chest  on March 1 of 2022 showing a left upper lobe mass abutting the left side of the upper mediastinum measuring 3.6 cm in greatest dimension.  There was a small to moderate left pleural effusion which had increased in size compared to the PET/CT of February 8 of 2022 and it was new compared to the CT scan of the chest of October 28 of 2021.  There was scattered bilateral pulmonary nodules however most of these were only a few millimeters and nonsignificant and unchanged compared to October.  There was also  a 2.8 cm hypodense area in the pancreatic head and uncinate process.  There was some mild stranding of the peripancreatic fat which was thought to be related to inflammation around the pancreas.  There was a compression fracture of L1 vertebral body that  was stable. She was admitted to the hospital from March 3 to March 5, 2022 for a video-assisted thorascopic pleural biopsy and talc pleurodesis.Operative report from March 3, 2022 states that about 100 cc of serosanguineous fluid was removed.  There were  small subtle pleural nodules within the posterior inferior part of the pleural that was seen along with diffuse thickening of the pleura anterolaterally which was biopsied.  The tumor seemed to invade the mediastinal fat by inspection.  Talc was inserted  after frozen sections demonstrated malignancy.        PAST MEDICAL HISTORY     1-  nasal polyps s/p surgery in November 2021  2-   hypertension  3-  follow-up in October 2021 resulting in cervical vertebral body fracture s/p cervical posterior fusion on October 28, 2021  4-  pancreatitis?  Seen on PET and CT imaging of February and March 2022.  Pancreatic inflammation is stranding in the setting of possible alcohol abuse.  5-   L4 compression vertebral body fracture seen on imaging studies since February 2022        SOCIAL HISTORY     Patient  lives in [place] on her own.   She has 1 daughter Didi who is an  in [place].  He started smoking at the age of 18.  She stopped in 1994.   She smoked for 25 years 1 pack a day.  She is retired.  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  Patient and daughter report that she has several glasses of wine at night.        CURRENT MEDS     See medication list, meds reviewed        ALLERGIES     patient is allergic to aspirin and has nasal polyps associated with this        FAMILY HISTORY     Patient's father had prostate cancer at the age of 70 patient's mother had ovarian cancer at the age of 86 and patient's brother also had prostate cancer at age 68    Subjective    HPI      Objective    BSA: 2.31 meters squared  /83 (BP Location: Right arm, Patient Position: Sitting, BP Cuff Size: Adult)   Pulse 79   Temp 35.5 °C (95.9 °F) (Temporal)   Resp 18   Wt 111 kg (243 lb 13.3 oz)   LMP  (LMP Unknown)   SpO2 93%   BMI 37.07 kg/m²      Physical Exam    Performance Status:  {ECOG performance status:95713}      Assessment/Plan       Cancer Staging   No matching staging information was found for the patient.      Oncology History   Lung cancer (Multi)   8/7/2023 Initial Diagnosis    Lung cancer (CMS/HCC)     11/29/2023 -  Chemotherapy    Osimertinib, 28 Day Cycles          {Assess/PlanSmartLinks:64293}         Mega Angel MD             Right arm, Patient Position: Sitting, BP Cuff Size: Adult)   Pulse 79   Temp 35.5 °C (95.9 °F) (Temporal)   Resp 18   Wt 111 kg (243 lb 13.3 oz)   LMP  (LMP Unknown)   SpO2 93%   BMI 37.07 kg/m²      Physical Exam  Constitutional:       General: She is not in acute distress.     Appearance: Normal appearance. She is not ill-appearing, toxic-appearing or diaphoretic.   HENT:      Nose: No congestion or rhinorrhea.      Mouth/Throat:      Pharynx: No oropharyngeal exudate or posterior oropharyngeal erythema.   Eyes:      General: No scleral icterus.     Conjunctiva/sclera: Conjunctivae normal.   Cardiovascular:      Rate and Rhythm: Normal rate and regular rhythm.      Pulses: Normal pulses.      Heart sounds: Normal heart sounds. No murmur heard.     No friction rub. No gallop.   Pulmonary:      Effort: Pulmonary effort is normal. No respiratory distress.      Breath sounds: Normal breath sounds. No stridor. No wheezing, rhonchi or rales.   Chest:      Chest wall: No tenderness.   Abdominal:      General: Abdomen is flat. Bowel sounds are normal. There is no distension.      Palpations: Abdomen is soft. There is no mass.      Tenderness: There is no abdominal tenderness. There is no guarding or rebound.   Musculoskeletal:      Cervical back: No tenderness.      Right lower leg: No edema.      Left lower leg: No edema.   Lymphadenopathy:      Cervical: No cervical adenopathy.   Skin:     General: Skin is warm.      Coloration: Skin is not jaundiced.   Neurological:      General: No focal deficit present.      Mental Status: She is oriented to person, place, and time.   Psychiatric:         Mood and Affect: Mood normal.         Behavior: Behavior normal.         Performance Status:  Asymptomatic     Latest Reference Range & Units 06/19/25 14:48   GLUCOSE 74 - 99 mg/dL 90   SODIUM 136 - 145 mmol/L 140   POTASSIUM 3.5 - 5.3 mmol/L 4.3   CHLORIDE 98 - 107 mmol/L 104   Bicarbonate 21 - 32 mmol/L 26   Anion Gap 10 -  20 mmol/L 14   Blood Urea Nitrogen 6 - 23 mg/dL 14   Creatinine 0.50 - 1.05 mg/dL 0.99   EGFR >60 mL/min/1.73m*2 58 (L)   Calcium 8.6 - 10.6 mg/dL 9.1   Albumin 3.4 - 5.0 g/dL 3.9   Alkaline Phosphatase 33 - 136 U/L 101   ALT 7 - 45 U/L 19   AST 9 - 39 U/L 21   Bilirubin Total 0.0 - 1.2 mg/dL 0.6   Total Protein 6.4 - 8.2 g/dL 6.4   WBC 4.4 - 11.3 x10*3/uL 5.5   nRBC  COMMENT ONLY   RBC 4.00 - 5.20 x10*6/uL 4.24   HEMOGLOBIN 12.0 - 16.0 g/dL 13.7   HEMATOCRIT 36.0 - 46.0 % 41.8   MCV 80 - 100 fL 99   MCH 26.0 - 34.0 pg 32.3   MCHC 32.0 - 36.0 g/dL 32.8   RED CELL DISTRIBUTION WIDTH 11.5 - 14.5 % 12.7   Platelets 150 - 450 x10*3/uL 190   Neutrophils % 40.0 - 80.0 % 57.3   Immature Granulocytes %, Automated 0.0 - 0.9 % 0.2   Lymphocytes % 13.0 - 44.0 % 28.7   Monocytes % 2.0 - 10.0 % 12.4   Eosinophils % 0.0 - 6.0 % 0.7   Basophils % 0.0 - 2.0 % 0.7   Neutrophils Absolute 1.60 - 5.50 x10*3/uL 3.13   Immature Granulocytes Absolute, Automated 0.00 - 0.50 x10*3/uL 0.01   Lymphocytes Absolute 0.80 - 3.00 x10*3/uL 1.57   Monocytes Absolute 0.05 - 0.80 x10*3/uL 0.68   Eosinophils Absolute 0.00 - 0.40 x10*3/uL 0.04   Basophils Absolute 0.00 - 0.10 x10*3/uL 0.04   (L): Data is abnormally low    CT CHEST WO IV CONTRAST; 6/19/2025      IMPRESSION:  Stable partially calcified nodular pleural densities medially and  posteriorly in the upper 3rd of the left hemithorax. This could be  related to remote trauma, previous granulomatous disease, or  pleurodesis.      Stable small fissural lymph node along the right minor fissure  measuring 9 x 5 mm.      There is a stable left upper lobe spiculated density consistent with  either stable tumor or stable scarring from previous tumor as  described. This abuts against the left side of the upper mediastinal  pleural surface. Please see above for details.      There are multiple nodular densities along the left major fissure  consistent with fissural lymph nodes. The largest and most  cephalad  of these is slightly larger today. This change is of uncertain  clinical significance. The other likely fissural lymph nodes are  grossly stable. There is also a stable 6 mm noncalcified left lower  lobe lung nodule which is probably from previous infection or  inflammation.      Stable mild superior endplate compression fracture at L1. Stable DJD  in the thoracic spine as described.      Contracted gallbladder with multiple gallstones.      Hypodense fullness in the region of the pancreatic head and neck is  mildly progressed. Slowly growing pancreatic neoplasm must be  considered. Suggest histologic diagnosis.       Assessment/Plan     This is a 80-year-old patient with EGFR mutation positive adenocarcinoma of the lung, exon 21, with involvement of the mediastinal fat, left lung and left pleura diagnosed in March 2022 now over 3 years out since starting single agent Tagrisso that was initiated on April 14, 2022.  She has no evidence of disease progression and is tolerating this agent well.  On imaging there has been slight increase in the size of her IPMN within her pancreas.  I have reached out to pancreatic surgery team for evaluation again.  They have seen her in the past with follow-up.      Cancer Staging   No matching staging information was found for the patient.      Oncology History   Lung cancer (Multi)   8/7/2023 Initial Diagnosis    Lung cancer (CMS/HCC)     11/29/2023 -  Chemotherapy    Osimertinib, 28 Day Cycles                   Mega Angel MD

## 2025-07-02 ASSESSMENT — ENCOUNTER SYMPTOMS
DIAPHORESIS: 0
CHANGE IN BOWEL HABIT: 1
MYALGIAS: 0
NUMBNESS: 0
JOINT SWELLING: 0
WEAKNESS: 0
FATIGUE: 0
COUGH: 0
NECK PAIN: 0
SWOLLEN GLANDS: 0
ABDOMINAL PAIN: 0
VERTIGO: 0
ANOREXIA: 0
CHILLS: 0
HEADACHES: 0
SORE THROAT: 0
FEVER: 0
VISUAL CHANGE: 0
ARTHRALGIAS: 0
NAUSEA: 0
VOMITING: 0

## 2025-07-07 PROCEDURE — RXMED WILLOW AMBULATORY MEDICATION CHARGE

## 2025-07-08 ENCOUNTER — SPECIALTY PHARMACY (OUTPATIENT)
Dept: PHARMACY | Facility: CLINIC | Age: 80
End: 2025-07-08

## 2025-07-10 ENCOUNTER — OFFICE VISIT (OUTPATIENT)
Dept: PULMONOLOGY | Facility: CLINIC | Age: 80
End: 2025-07-10
Payer: MEDICARE

## 2025-07-10 VITALS
HEART RATE: 66 BPM | WEIGHT: 241.9 LBS | BODY MASS INDEX: 36.78 KG/M2 | SYSTOLIC BLOOD PRESSURE: 109 MMHG | DIASTOLIC BLOOD PRESSURE: 73 MMHG | RESPIRATION RATE: 16 BRPM | OXYGEN SATURATION: 96 %

## 2025-07-10 DIAGNOSIS — I10 HYPERTENSION, UNSPECIFIED TYPE: ICD-10-CM

## 2025-07-10 DIAGNOSIS — J45.909 ASTHMA, UNSPECIFIED ASTHMA SEVERITY, UNSPECIFIED WHETHER COMPLICATED, UNSPECIFIED WHETHER PERSISTENT (HHS-HCC): ICD-10-CM

## 2025-07-10 DIAGNOSIS — J44.9 CHRONIC OBSTRUCTIVE PULMONARY DISEASE, UNSPECIFIED COPD TYPE (MULTI): Primary | ICD-10-CM

## 2025-07-10 PROCEDURE — 99213 OFFICE O/P EST LOW 20 MIN: CPT | Performed by: INTERNAL MEDICINE

## 2025-07-10 PROCEDURE — 1036F TOBACCO NON-USER: CPT | Performed by: INTERNAL MEDICINE

## 2025-07-10 PROCEDURE — 99212 OFFICE O/P EST SF 10 MIN: CPT

## 2025-07-10 PROCEDURE — 1126F AMNT PAIN NOTED NONE PRSNT: CPT | Performed by: INTERNAL MEDICINE

## 2025-07-10 PROCEDURE — 3078F DIAST BP <80 MM HG: CPT | Performed by: INTERNAL MEDICINE

## 2025-07-10 PROCEDURE — 1159F MED LIST DOCD IN RCRD: CPT | Performed by: INTERNAL MEDICINE

## 2025-07-10 PROCEDURE — 3074F SYST BP LT 130 MM HG: CPT | Performed by: INTERNAL MEDICINE

## 2025-07-10 RX ORDER — LOSARTAN POTASSIUM 25 MG/1
25 TABLET ORAL DAILY
Qty: 90 TABLET | Refills: 1 | Status: SHIPPED | OUTPATIENT
Start: 2025-07-10

## 2025-07-10 RX ORDER — METOPROLOL SUCCINATE 50 MG/1
50 TABLET, EXTENDED RELEASE ORAL DAILY
Qty: 90 TABLET | Refills: 1 | Status: SHIPPED | OUTPATIENT
Start: 2025-07-10

## 2025-07-10 RX ORDER — FLUTICASONE FUROATE, UMECLIDINIUM BROMIDE AND VILANTEROL TRIFENATATE 200; 62.5; 25 UG/1; UG/1; UG/1
1 POWDER RESPIRATORY (INHALATION) DAILY
Qty: 3 EACH | Refills: 3 | Status: SHIPPED | OUTPATIENT
Start: 2025-07-10

## 2025-07-10 RX ORDER — ALBUTEROL SULFATE 90 UG/1
2 INHALANT RESPIRATORY (INHALATION) EVERY 6 HOURS PRN
Qty: 18 G | Refills: 0 | Status: SHIPPED | OUTPATIENT
Start: 2025-07-10

## 2025-07-10 ASSESSMENT — ENCOUNTER SYMPTOMS: DEPRESSION: 0

## 2025-07-10 ASSESSMENT — PAIN SCALES - GENERAL: PAINLEVEL_OUTOF10: 0-NO PAIN

## 2025-07-10 ASSESSMENT — COLUMBIA-SUICIDE SEVERITY RATING SCALE - C-SSRS
6. HAVE YOU EVER DONE ANYTHING, STARTED TO DO ANYTHING, OR PREPARED TO DO ANYTHING TO END YOUR LIFE?: NO
1. IN THE PAST MONTH, HAVE YOU WISHED YOU WERE DEAD OR WISHED YOU COULD GO TO SLEEP AND NOT WAKE UP?: NO
2. HAVE YOU ACTUALLY HAD ANY THOUGHTS OF KILLING YOURSELF?: NO

## 2025-07-10 ASSESSMENT — PATIENT HEALTH QUESTIONNAIRE - PHQ9
1. LITTLE INTEREST OR PLEASURE IN DOING THINGS: NOT AT ALL
SUM OF ALL RESPONSES TO PHQ9 QUESTIONS 1 AND 2: 0
2. FEELING DOWN, DEPRESSED OR HOPELESS: NOT AT ALL

## 2025-07-10 NOTE — PROGRESS NOTES
Department of Medicine  Division of Pulmonary, Critical Care, and Sleep Medicine  Follow-Up Visit  [facility] - [address], [address]    Physician HPI (7/10/2025):  Pleasant 80-year-old female with history of asthma/COPD, nasal polyps, stage IV lung adenocarcinoma presenting for follow-up.    Has been doing well since last visit, no exacerbations, rarely using albuterol.  Compliant with Trelegy, also getting Nucala injection with allergy.      Immunization History   Administered Date(s) Administered    COVID-19, mRNA, LNP-S, PF, 30 mcg/0.3 mL dose 02/09/2021, 03/02/2021, 09/28/2021    Flu vaccine, quadrivalent, high-dose, preservative free, age 65y+ (FLUZONE) 11/09/2020, 10/03/2022    Flu vaccine, trivalent, preservative free, HIGH-DOSE, age 65y+ (Fluzone) 10/27/2017, 11/08/2018    Influenza, Seasonal, Quadrivalent, Adjuvanted 10/19/2021    Influenza, trivalent, adjuvanted 11/01/2019, 10/29/2024    Moderna COVID-19 vaccine, 12 years and older (50mcg/0.5mL)(Spikevax) 01/18/2024    Pfizer COVID-19 vaccine, bivalent, age 12 years and older (30 mcg/0.3 mL) 10/03/2022    Pfizer Gray Cap SARS-CoV-2 04/12/2022    Pneumococcal conjugate vaccine, 13-valent (PREVNAR 13) 01/16/2015    Pneumococcal conjugate vaccine, 20-valent (PREVNAR 20) 06/18/2024    Pneumococcal polysaccharide vaccine, 23-valent, age 2 years and older (PNEUMOVAX 23) 09/08/2012, 04/26/2022    RSV, 60 Years And Older (AREXVY) 11/24/2023    Tdap vaccine, age 7 year and older (BOOSTRIX, ADACEL) 09/08/2012, 01/01/2018    Zoster vaccine, recombinant, adult (SHINGRIX) 04/25/2018, 01/22/2020, 06/05/2023, 09/01/2023       Current Medications:  Current Outpatient Medications   Medication Instructions    acetaminophen (Tylenol) 325 mg tablet Every 6 hours PRN    albuterol (Ventolin HFA) 90 mcg/actuation inhaler Inhale.    cholecalciferol (Vitamin D-3) 25 MCG (1000 UT) tablet 1 tablet, Daily    fluticasone-umeclidin-vilanter (Trelegy Ellipta)  "200-62.5-25 mcg blister with device 1 puff, inhalation, Daily    krill oil 500 mg capsule 1 capsule, Daily    losartan (COZAAR) 25 mg, oral, Daily    mepolizumab (NUCALA SUBQ) Inject under the skin.    metoprolol succinate XL (TOPROL-XL) 50 mg, oral, Daily    montelukast (Singulair) 10 mg tablet TAKE 1 TABLET DAILY AS DIRECTED    multivit/folic acid/vit K1 (ONE-A-DAY WOMEN'S 50 PLUS ORAL) 1 tablet, Daily    Tagrisso 80 mg, oral, Daily, Swallow whole.        Drug Allergies/Intolerances:  Allergies   Allergen Reactions    Aspirin Other     anxiety    maybe allergic, had panic and allergy is questionable??    Tree And Shrub Pollen Runny nose          Visit Vitals  Resp 16   Wt 110 kg (241 lb 14.4 oz)   LMP  (LMP Unknown)   BMI 36.78 kg/m²   OB Status Postmenopausal   Smoking Status Former   BSA 2.3 m²        Physical exam  Constitutional: Normal appearance.  HEENT: Normocephalic and atraumatic.  Cardiovascular: Normal rate and regular rhythm.  Pulmonary: Normal respiratory effort, bilateral clear breath sounds, no wheezing or rhonchi.  Musculoskeletal: No edema, no cyanosis.  Neurological: Awake, alert and oriented x3.  Psychiatric: Normal behavior, mood and affect.      Pulmonary Function Test Results     Pulmonary Functions Testing Results:    No results found for: \"FEV1\", \"FVC\", \"ELZ3KZX\", \"TLC\", \"DLCO\"      Chest Radiograph     XR chest 1 view 11/08/2022    Narrative  MRN: [id number]  Patient Name: ERIKA GOMEZ    STUDY:  CHEST 1 VIEW;  11/8/2022 7:46 am    INDICATION:  pneumonia .    COMPARISON:  11/05/2022; CT chest dated 11/05/2022    ACCESSION NUMBER(S):  [id number]    ORDERING CLINICIAN:  YUKI PRATT    TECHNIQUE:    FINDINGS:  Heart is normal in size.    There is poor definition of the left hemidiaphragm. There is no  discrete consolidation.    There appears to be tortuosity of the aorta.    There are degenerative changes spine.    COMPARISON OF FINDING:  The chest is similar.    Impression  The spiculated " area in the left upper lobe visible on CT is not well  seen. This may be due to a combination of rotation and overlying  wires.    Question of left basilar atelectasis or scarring.      Echocardiogram     Echocardiogram     Narrative  [facility] at [facility], [address] [place] [address]  Tel [phone] and Fax [phone]    TRANSTHORACIC ECHOCARDIOGRAM REPORT      Patient Name:     ERIKA GOMEZ Reading Physician:   [id number] Margo Long MD  Study Date:       12/12/2022     Referring Physician: MALGORZATA CHAND  MRN/PID:          [id number]       PCP:  Accession/Order#: [id number]      Department Location: [facility] Echo Lab  YOB: 1945      Fellow:  Gender:           F              Nurse:  Admit Date:                      Sonographer:         Mishel Boudreaux RDCS  Admission Status: Outpatient     Additional Staff:  Height:           175.26 cm      CC Report to:  Weight:           99.79 kg       Study Type:          Echocardiogram  BSA:              2.15 m2  Blood Pressure: 128 /80 mmHg    Diagnosis/ICD: R06.02-Shortness of breath  Indication:    SOB  Procedure/CPT: Echo Complete w Full [id number]    Patient History:  Pertinent History: A-Fib and HTN. Adenocarcinoma.    Study Detail: The following Echo studies were performed: 2D, M-Mode, Doppler and  color flow. Technically challenging study due to body habitus.      PHYSICIAN INTERPRETATION:  Left Ventricle: The left ventricular systolic function is normal, with an estimated ejection fraction of 60-65%. There are no regional wall motion abnormalities. The left ventricular cavity size is normal. Left ventricular diastolic filling was indeterminate.  Left Atrium: The left atrium is mildly dilated.  Right Ventricle: The right ventricle is normal in size. There is normal right ventricular global systolic function.  Right Atrium: The right atrium is normal in size.  Aortic Valve: The aortic valve is trileaflet. There is  no evidence of aortic valve regurgitation. The peak instantaneous gradient of the aortic valve is 7.4 mmHg.  Mitral Valve: The mitral valve is normal in structure. There is trace to mild mitral valve regurgitation.  Tricuspid Valve: The tricuspid valve is structurally normal. There is trace tricuspid regurgitation. The Doppler estimated RVSP is within normal limits at 28.1 mmHg.  Pulmonic Valve: The pulmonic valve is not well visualized. There is physiologic pulmonic valve regurgitation.  Pericardium: There is a trivial pericardial effusion. There is an anterior clear space.  Aorta: The aortic root is normal.  Systemic Veins: The inferior vena cava appears to be of normal size. There is IVC inspiratory collapse greater than 50%.  In comparison to the previous echocardiogram(s): Compared with the prior exam from 9/24/2018 ( [facility]) there are no signficant changes.      CONCLUSIONS:  1. Left ventricular systolic function is normal with a 60-65% estimated ejection fraction.  2. RVSP within normal limits.  3. Compared with the prior exam from 9/24/2018 ( [facility]) there are no signficant changes.    QUANTITATIVE DATA SUMMARY:  2D MEASUREMENTS:  Normal Ranges:  Ao Root d:     3.20 cm   (2.0-3.7cm)  LAs:           3.15 cm   (2.7-4.0cm)  RVIDd:         2.04 cm   (0.9-3.6cm)  IVSd:          0.86 cm   (0.6-1.1cm)  LVPWd:         0.89 cm   (0.6-1.1cm)  LVIDd:         4.74 cm   (3.9-5.9cm)  LVIDs:         3.34 cm  LV Mass Index: 64.4 g/m2  LV % FS        29.5 %    LA VOLUME:  Normal Ranges:  LA Vol A4C:       77.2 ml    (22+/-6mL/m2)  LA Vol A2C:       85.0 ml  LA Vol BP:        81.7 ml  LA Vol Index A4C: 35.9 ml/m2  LA Vol Index A2C: 39.5 ml/m2  LA Vol Index BP:  38.0 ml/m2  LA Volume Index:  38.0 ml/m2  LA Vol A4C:       71.4 ml  LA Vol A2C:       82.3 ml    RA VOLUME BY A/L METHOD:  Normal Ranges:  RA Area A4C: 14.3 cm2    AORTA MEASUREMENTS:  Normal Ranges:  Ao Sinus, d: 3.30 cm (2.1-3.5cm)  Asc Ao, d:    3.30 cm (2.1-3.4cm)    LV SYSTOLIC FUNCTION BY 2D PLANIMETRY (MOD):  Normal Ranges:  EF-A4C View: 68.8 % (>=55%)  EF-A2C View: 53.4 %  EF-Biplane:  62.0 %    LV DIASTOLIC FUNCTION:  Normal Ranges:  MV Peak E:        0.98 m/s    (0.7-1.2 m/s)  MV Peak A:        1.00 m/s    (0.42-0.7 m/s)  E/A Ratio:        0.98        (1.0-2.2)  MV e'             0.08 m/s    (>8.0)  MV A Dur:         125.59 msec  E/e' Ratio:       12.25       (<8.0)  a'                0.10 m/s  PulmV Sys Jacques:    56.50 cm/s  PulmV Hong Jacques:   45.41 cm/s  PulmV S/D Jacques:    1.24  PulmV A Revs Jacques: 33.37 cm/s  PulmV A Revs Dur: 151.28 msec    MITRAL VALVE:  Normal Ranges:  MV DT: 194 msec (150-240msec)    AORTIC VALVE:  Normal Ranges:  AoV Vmax:      1.36 m/s (<=1.7m/s)  AoV Peak P.4 mmHg (<20mmHg)  LVOT Max Jacques:  1.31 m/s (<=1.1m/s)  LVOT VTI:      29.73 cm  LVOT Diameter: 2.03 cm  (1.8-2.4cm)  AoV Area,Vmax: 3.14 cm2 (2.5-4.5cm2)    RIGHT VENTRICLE:  RV 1   3.5 cm  RV 2   2.1 cm  RV 3   8.0 cm  TAPSE: 34.0 mm  RV s'  0.15 m/s    TRICUSPID VALVE/RVSP:  Normal Ranges:  Peak TR Velocity: 2.51 m/s  RV Syst Pressure: 28.1 mmHg (< 30mmHg)  IVC Diam:         1.60 cm    PULMONIC VALVE:  Normal Ranges:  PV Accel Time: 103 msec (>120ms)  PV Max Jacques:    0.9 m/s  (0.6-0.9m/s)  PV Max PG:     3.1 mmHg    Pulmonary Veins:  PulmV A Revs Dur: 151.28 msec  PulmV A Revs Jacques: 33.37 cm/s  PulmV Hong Jacques:   45.41 cm/s  PulmV S/D Jacques:    1.24  PulmV Sys Jacques:    56.50 cm/s    AORTA:  Asc Ao Diam 3.33 cm      [id number] Margo Long MD  Electronically signed on 2022 at 6:10:32 PM         Final        Chest CT Scan     No results found for this or any previous visit from the past 365 days.       Laboratory Studies     Lab Results   Component Value Date    WBC 5.5 2025    HGB 13.7 2025    HCT 41.8 2025    MCV 99 2025     2025      Lab Results   Component Value Date    GLUCOSE 90 2025    CALCIUM 9.1 2025      "06/19/2025    K 4.3 06/19/2025    CO2 26 06/19/2025     06/19/2025    BUN 14 06/19/2025    CREATININE 0.99 06/19/2025      Lab Results   Component Value Date    ALT 19 06/19/2025    AST 21 06/19/2025    ALKPHOS 101 06/19/2025    BILITOT 0.6 06/19/2025        Sputum Culture     No results found for: \"AFBCX\"   No results found for: \"RESPCULTCYFI\"  No results found for the last 90 days.          Assessment and Plan / Recommendations     Pleasant 80-year-old female with history of asthma/COPD, stage IV lung adenocarcinoma presenting for follow-up.     1. Asthma/COPD with peripheral eosinophilia, very well-controlled, no exacerbations since last visit, rarely using albuterol  - Refill and continue  continue Trelegy, albuterol as needed   -Continue montelukast  -Continue Nucala, following with allergy        2. Lung adenocarcinoma on Tagrisso, following with oncology.  CT from 6/19/2025 with stable findings.       Return to clinic in 1 year or sooner with any concerns.     This dictation was created using voice recognition software. Phonetic and/or minor grammatical errors may exist.           Agustina Marie MD   07/10/2025    "

## 2025-07-11 ENCOUNTER — PHARMACY VISIT (OUTPATIENT)
Dept: PHARMACY | Facility: CLINIC | Age: 80
End: 2025-07-11
Payer: COMMERCIAL

## 2025-07-22 ENCOUNTER — APPOINTMENT (OUTPATIENT)
Dept: PULMONOLOGY | Facility: CLINIC | Age: 80
End: 2025-07-22
Payer: MEDICARE

## 2025-07-28 ENCOUNTER — SPECIALTY PHARMACY (OUTPATIENT)
Dept: PHARMACY | Facility: CLINIC | Age: 80
End: 2025-07-28

## 2025-07-28 DIAGNOSIS — C34.90 MALIGNANT NEOPLASM OF LUNG, UNSPECIFIED LATERALITY, UNSPECIFIED PART OF LUNG (MULTI): ICD-10-CM

## 2025-07-28 NOTE — TELEPHONE ENCOUNTER
[facility] Refill Management:    Medication(s) Requested: Tagrisso (Osimertinib)    Date of Request: 07/28/25 11:49 AM    - Labs in last 3 months: Yes    Date: 6/19/25  - Office visit in last 3 months: Yes    Date: 6/23/25  - Changes in medications in last 3 months: No  - Actionable labs or dose adjustments requiring physician clarification: No    Next FUV scheduled for 9/22/25 w/ JONATHON Nichols-CNP    Approved for refill under Consult Agreement: Yes      Erick Joseph, EnriqueD

## 2025-08-04 PROCEDURE — RXMED WILLOW AMBULATORY MEDICATION CHARGE

## 2025-08-08 ENCOUNTER — SPECIALTY PHARMACY (OUTPATIENT)
Dept: PHARMACY | Facility: CLINIC | Age: 80
End: 2025-08-08

## 2025-08-12 ENCOUNTER — PHARMACY VISIT (OUTPATIENT)
Dept: PHARMACY | Facility: CLINIC | Age: 80
End: 2025-08-12
Payer: COMMERCIAL

## 2025-09-06 ENCOUNTER — SPECIALTY PHARMACY (OUTPATIENT)
Dept: PHARMACY | Facility: CLINIC | Age: 80
End: 2025-09-06

## 2025-10-14 ENCOUNTER — APPOINTMENT (OUTPATIENT)
Dept: PRIMARY CARE | Facility: CLINIC | Age: 80
End: 2025-10-14
Payer: MEDICARE

## 2025-10-27 ENCOUNTER — APPOINTMENT (OUTPATIENT)
Dept: OTOLARYNGOLOGY | Facility: CLINIC | Age: 80
End: 2025-10-27
Payer: MEDICARE